# Patient Record
Sex: MALE | Race: WHITE
[De-identification: names, ages, dates, MRNs, and addresses within clinical notes are randomized per-mention and may not be internally consistent; named-entity substitution may affect disease eponyms.]

---

## 2019-03-08 PROBLEM — Z00.00 ENCOUNTER FOR PREVENTIVE HEALTH EXAMINATION: Status: ACTIVE | Noted: 2019-03-08

## 2019-09-22 ENCOUNTER — TRANSCRIPTION ENCOUNTER (OUTPATIENT)
Age: 69
End: 2019-09-22

## 2019-10-01 ENCOUNTER — EMERGENCY (EMERGENCY)
Facility: HOSPITAL | Age: 69
LOS: 0 days | Discharge: LEFT BEFORE DISCHARGE | End: 2019-10-01
Admitting: EMERGENCY MEDICINE

## 2019-10-01 VITALS
OXYGEN SATURATION: 85 % | TEMPERATURE: 101 F | HEART RATE: 120 BPM | RESPIRATION RATE: 18 BRPM | SYSTOLIC BLOOD PRESSURE: 125 MMHG | DIASTOLIC BLOOD PRESSURE: 70 MMHG

## 2019-10-01 DIAGNOSIS — R50.9 FEVER, UNSPECIFIED: ICD-10-CM

## 2019-10-01 DIAGNOSIS — R53.1 WEAKNESS: ICD-10-CM

## 2019-10-01 NOTE — ED ADULT NURSE NOTE - EXPLANATION OF PATIENT'S REASON FOR LEAVING
Patient left without being seen by a doctor. Family stated that they were taking him to another hospital and did not want to wait. Patient informed on risks of leaving the hospital and the severity of the patient's illness. Patient and family both continually wanted to leave. Informed both family and patient that the patient needs medical assistance and to return to the ER.

## 2022-01-01 ENCOUNTER — INPATIENT (INPATIENT)
Facility: HOSPITAL | Age: 72
LOS: 2 days | End: 2022-04-14
Attending: INTERNAL MEDICINE | Admitting: INTERNAL MEDICINE
Payer: MEDICARE

## 2022-01-01 VITALS
DIASTOLIC BLOOD PRESSURE: 64 MMHG | RESPIRATION RATE: 22 BRPM | TEMPERATURE: 100 F | HEART RATE: 103 BPM | SYSTOLIC BLOOD PRESSURE: 97 MMHG | OXYGEN SATURATION: 94 %

## 2022-01-01 DIAGNOSIS — Z98.890 OTHER SPECIFIED POSTPROCEDURAL STATES: Chronic | ICD-10-CM

## 2022-01-01 LAB
-  AMPICILLIN: SIGNIFICANT CHANGE UP
-  CEFTRIAXONE: SIGNIFICANT CHANGE UP
-  CIPROFLOXACIN: SIGNIFICANT CHANGE UP
-  ERYTHROMYCIN: SIGNIFICANT CHANGE UP
-  LEVOFLOXACIN: SIGNIFICANT CHANGE UP
-  LEVOFLOXACIN: SIGNIFICANT CHANGE UP
-  NITROFURANTOIN: SIGNIFICANT CHANGE UP
-  PENICILLIN: SIGNIFICANT CHANGE UP
-  TETRACYCLINE: SIGNIFICANT CHANGE UP
-  TRIMETHOPRIM/SULFAMETHOXAZOLE: SIGNIFICANT CHANGE UP
-  VANCOMYCIN: SIGNIFICANT CHANGE UP
-  VANCOMYCIN: SIGNIFICANT CHANGE UP
ALBUMIN SERPL ELPH-MCNC: 2.9 G/DL — LOW (ref 3.5–5.2)
ALBUMIN SERPL ELPH-MCNC: 3.1 G/DL — LOW (ref 3.5–5.2)
ALBUMIN SERPL ELPH-MCNC: 3.2 G/DL — LOW (ref 3.5–5.2)
ALBUMIN SERPL ELPH-MCNC: 3.3 G/DL — LOW (ref 3.5–5.2)
ALBUMIN SERPL ELPH-MCNC: 3.7 G/DL — SIGNIFICANT CHANGE UP (ref 3.5–5.2)
ALP SERPL-CCNC: 128 U/L — HIGH (ref 30–115)
ALP SERPL-CCNC: 137 U/L — HIGH (ref 30–115)
ALP SERPL-CCNC: 150 U/L — HIGH (ref 30–115)
ALP SERPL-CCNC: 89 U/L — SIGNIFICANT CHANGE UP (ref 30–115)
ALP SERPL-CCNC: 99 U/L — SIGNIFICANT CHANGE UP (ref 30–115)
ALT FLD-CCNC: 12 U/L — SIGNIFICANT CHANGE UP (ref 0–41)
ALT FLD-CCNC: 1450 U/L — HIGH (ref 0–41)
ALT FLD-CCNC: 1858 U/L — HIGH (ref 0–41)
ALT FLD-CCNC: 2088 U/L — HIGH (ref 0–41)
ALT FLD-CCNC: 9 U/L — SIGNIFICANT CHANGE UP (ref 0–41)
ANION GAP SERPL CALC-SCNC: 14 MMOL/L — SIGNIFICANT CHANGE UP (ref 7–14)
ANION GAP SERPL CALC-SCNC: 15 MMOL/L — HIGH (ref 7–14)
ANION GAP SERPL CALC-SCNC: 16 MMOL/L — HIGH (ref 7–14)
ANION GAP SERPL CALC-SCNC: 18 MMOL/L — HIGH (ref 7–14)
ANION GAP SERPL CALC-SCNC: 19 MMOL/L — HIGH (ref 7–14)
ANION GAP SERPL CALC-SCNC: 20 MMOL/L — HIGH (ref 7–14)
ANION GAP SERPL CALC-SCNC: 21 MMOL/L — HIGH (ref 7–14)
ANION GAP SERPL CALC-SCNC: 28 MMOL/L — HIGH (ref 7–14)
ANISOCYTOSIS BLD QL: SLIGHT — SIGNIFICANT CHANGE UP
APPEARANCE UR: CLEAR — SIGNIFICANT CHANGE UP
APPEARANCE UR: CLEAR — SIGNIFICANT CHANGE UP
APTT BLD: 23.4 SEC — CRITICAL LOW (ref 27–39.2)
AST SERPL-CCNC: 16 U/L — SIGNIFICANT CHANGE UP (ref 0–41)
AST SERPL-CCNC: 2479 U/L — HIGH (ref 0–41)
AST SERPL-CCNC: 3002 U/L — HIGH (ref 0–41)
AST SERPL-CCNC: 3233 U/L — HIGH (ref 0–41)
AST SERPL-CCNC: 35 U/L — SIGNIFICANT CHANGE UP (ref 0–41)
B PERT IGG+IGM PNL SER: ABNORMAL
BACTERIA # UR AUTO: ABNORMAL
BASE EXCESS BLDV CALC-SCNC: 4.2 MMOL/L — HIGH (ref -2–3)
BASOPHILS # BLD AUTO: 0 K/UL — SIGNIFICANT CHANGE UP (ref 0–0.2)
BASOPHILS # BLD AUTO: 0.05 K/UL — SIGNIFICANT CHANGE UP (ref 0–0.2)
BASOPHILS # BLD AUTO: 0.09 K/UL — SIGNIFICANT CHANGE UP (ref 0–0.2)
BASOPHILS # BLD AUTO: 0.1 K/UL — SIGNIFICANT CHANGE UP (ref 0–0.2)
BASOPHILS NFR BLD AUTO: 0 % — SIGNIFICANT CHANGE UP (ref 0–1)
BASOPHILS NFR BLD AUTO: 0.3 % — SIGNIFICANT CHANGE UP (ref 0–1)
BASOPHILS NFR BLD AUTO: 0.4 % — SIGNIFICANT CHANGE UP (ref 0–1)
BASOPHILS NFR BLD AUTO: 0.5 % — SIGNIFICANT CHANGE UP (ref 0–1)
BILIRUB SERPL-MCNC: 0.7 MG/DL — SIGNIFICANT CHANGE UP (ref 0.2–1.2)
BILIRUB SERPL-MCNC: 0.8 MG/DL — SIGNIFICANT CHANGE UP (ref 0.2–1.2)
BILIRUB SERPL-MCNC: 0.9 MG/DL — SIGNIFICANT CHANGE UP (ref 0.2–1.2)
BILIRUB UR-MCNC: NEGATIVE — SIGNIFICANT CHANGE UP
BILIRUB UR-MCNC: NEGATIVE — SIGNIFICANT CHANGE UP
BUN SERPL-MCNC: 30 MG/DL — HIGH (ref 10–20)
BUN SERPL-MCNC: 32 MG/DL — HIGH (ref 10–20)
BUN SERPL-MCNC: 33 MG/DL — HIGH (ref 10–20)
BUN SERPL-MCNC: 38 MG/DL — HIGH (ref 10–20)
BUN SERPL-MCNC: 38 MG/DL — HIGH (ref 10–20)
BUN SERPL-MCNC: 50 MG/DL — HIGH (ref 10–20)
BUN SERPL-MCNC: 55 MG/DL — HIGH (ref 10–20)
BUN SERPL-MCNC: 56 MG/DL — HIGH (ref 10–20)
BURR CELLS BLD QL SMEAR: SIGNIFICANT CHANGE UP
CA-I SERPL-SCNC: 1.11 MMOL/L — LOW (ref 1.15–1.33)
CALCIUM SERPL-MCNC: 8.2 MG/DL — LOW (ref 8.5–10.1)
CALCIUM SERPL-MCNC: 8.4 MG/DL — LOW (ref 8.5–10.1)
CALCIUM SERPL-MCNC: 8.5 MG/DL — SIGNIFICANT CHANGE UP (ref 8.5–10.1)
CALCIUM SERPL-MCNC: 8.5 MG/DL — SIGNIFICANT CHANGE UP (ref 8.5–10.1)
CALCIUM SERPL-MCNC: 8.6 MG/DL — SIGNIFICANT CHANGE UP (ref 8.5–10.1)
CALCIUM SERPL-MCNC: 8.9 MG/DL — SIGNIFICANT CHANGE UP (ref 8.5–10.1)
CALCIUM SERPL-MCNC: 9.1 MG/DL — SIGNIFICANT CHANGE UP (ref 8.5–10.1)
CALCIUM SERPL-MCNC: 9.5 MG/DL — SIGNIFICANT CHANGE UP (ref 8.5–10.1)
CHLORIDE SERPL-SCNC: 88 MMOL/L — LOW (ref 98–110)
CHLORIDE SERPL-SCNC: 90 MMOL/L — LOW (ref 98–110)
CHLORIDE SERPL-SCNC: 91 MMOL/L — LOW (ref 98–110)
CHLORIDE SERPL-SCNC: 94 MMOL/L — LOW (ref 98–110)
CHLORIDE SERPL-SCNC: 95 MMOL/L — LOW (ref 98–110)
CHLORIDE SERPL-SCNC: 95 MMOL/L — LOW (ref 98–110)
CHLORIDE SERPL-SCNC: 96 MMOL/L — LOW (ref 98–110)
CHLORIDE SERPL-SCNC: 97 MMOL/L — LOW (ref 98–110)
CO2 SERPL-SCNC: 14 MMOL/L — LOW (ref 17–32)
CO2 SERPL-SCNC: 16 MMOL/L — LOW (ref 17–32)
CO2 SERPL-SCNC: 21 MMOL/L — SIGNIFICANT CHANGE UP (ref 17–32)
CO2 SERPL-SCNC: 21 MMOL/L — SIGNIFICANT CHANGE UP (ref 17–32)
CO2 SERPL-SCNC: 23 MMOL/L — SIGNIFICANT CHANGE UP (ref 17–32)
CO2 SERPL-SCNC: 24 MMOL/L — SIGNIFICANT CHANGE UP (ref 17–32)
CO2 SERPL-SCNC: 25 MMOL/L — SIGNIFICANT CHANGE UP (ref 17–32)
CO2 SERPL-SCNC: 9 MMOL/L — CRITICAL LOW (ref 17–32)
COLOR FLD: YELLOW — SIGNIFICANT CHANGE UP
COLOR SPEC: YELLOW — SIGNIFICANT CHANGE UP
COLOR SPEC: YELLOW — SIGNIFICANT CHANGE UP
COMMENT - URINE: SIGNIFICANT CHANGE UP
CREAT SERPL-MCNC: 1.9 MG/DL — HIGH (ref 0.7–1.5)
CREAT SERPL-MCNC: 1.9 MG/DL — HIGH (ref 0.7–1.5)
CREAT SERPL-MCNC: 2 MG/DL — HIGH (ref 0.7–1.5)
CREAT SERPL-MCNC: 2.1 MG/DL — HIGH (ref 0.7–1.5)
CREAT SERPL-MCNC: 2.2 MG/DL — HIGH (ref 0.7–1.5)
CREAT SERPL-MCNC: 2.4 MG/DL — HIGH (ref 0.7–1.5)
CREAT SERPL-MCNC: 2.5 MG/DL — HIGH (ref 0.7–1.5)
CREAT SERPL-MCNC: 2.9 MG/DL — HIGH (ref 0.7–1.5)
CULTURE RESULTS: SIGNIFICANT CHANGE UP
D DIMER BLD IA.RAPID-MCNC: 1374 NG/ML DDU — HIGH (ref 0–230)
DIFF PNL FLD: ABNORMAL
DIFF PNL FLD: NEGATIVE — SIGNIFICANT CHANGE UP
EGFR: 22 ML/MIN/1.73M2 — LOW
EGFR: 27 ML/MIN/1.73M2 — LOW
EGFR: 28 ML/MIN/1.73M2 — LOW
EGFR: 31 ML/MIN/1.73M2 — LOW
EGFR: 33 ML/MIN/1.73M2 — LOW
EGFR: 35 ML/MIN/1.73M2 — LOW
EGFR: 37 ML/MIN/1.73M2 — LOW
EGFR: 37 ML/MIN/1.73M2 — LOW
EOSINOPHIL # BLD AUTO: 0.02 K/UL — SIGNIFICANT CHANGE UP (ref 0–0.7)
EOSINOPHIL # BLD AUTO: 0.03 K/UL — SIGNIFICANT CHANGE UP (ref 0–0.7)
EOSINOPHIL # BLD AUTO: 0.12 K/UL — SIGNIFICANT CHANGE UP (ref 0–0.7)
EOSINOPHIL # BLD AUTO: 0.25 K/UL — SIGNIFICANT CHANGE UP (ref 0–0.7)
EOSINOPHIL NFR BLD AUTO: 0.1 % — SIGNIFICANT CHANGE UP (ref 0–8)
EOSINOPHIL NFR BLD AUTO: 0.2 % — SIGNIFICANT CHANGE UP (ref 0–8)
EOSINOPHIL NFR BLD AUTO: 0.7 % — SIGNIFICANT CHANGE UP (ref 0–8)
EOSINOPHIL NFR BLD AUTO: 0.9 % — SIGNIFICANT CHANGE UP (ref 0–8)
EPI CELLS # UR: 3 /HPF — SIGNIFICANT CHANGE UP (ref 0–5)
FLUID INTAKE SUBSTANCE CLASS: SIGNIFICANT CHANGE UP
GAS PNL BLDV: 131 MMOL/L — LOW (ref 136–145)
GAS PNL BLDV: SIGNIFICANT CHANGE UP
GAS PNL BLDV: SIGNIFICANT CHANGE UP
GIANT PLATELETS BLD QL SMEAR: PRESENT — SIGNIFICANT CHANGE UP
GLUCOSE BLDC GLUCOMTR-MCNC: 128 MG/DL — HIGH (ref 70–99)
GLUCOSE BLDC GLUCOMTR-MCNC: 130 MG/DL — HIGH (ref 70–99)
GLUCOSE BLDC GLUCOMTR-MCNC: 182 MG/DL — HIGH (ref 70–99)
GLUCOSE BLDC GLUCOMTR-MCNC: 182 MG/DL — HIGH (ref 70–99)
GLUCOSE BLDC GLUCOMTR-MCNC: 199 MG/DL — HIGH (ref 70–99)
GLUCOSE FLD-MCNC: 102 MG/DL — SIGNIFICANT CHANGE UP
GLUCOSE SERPL-MCNC: 118 MG/DL — HIGH (ref 70–99)
GLUCOSE SERPL-MCNC: 122 MG/DL — HIGH (ref 70–99)
GLUCOSE SERPL-MCNC: 128 MG/DL — HIGH (ref 70–99)
GLUCOSE SERPL-MCNC: 130 MG/DL — HIGH (ref 70–99)
GLUCOSE SERPL-MCNC: 152 MG/DL — HIGH (ref 70–99)
GLUCOSE SERPL-MCNC: 154 MG/DL — HIGH (ref 70–99)
GLUCOSE SERPL-MCNC: 158 MG/DL — HIGH (ref 70–99)
GLUCOSE SERPL-MCNC: 167 MG/DL — HIGH (ref 70–99)
GLUCOSE UR QL: NEGATIVE — SIGNIFICANT CHANGE UP
GLUCOSE UR QL: NEGATIVE — SIGNIFICANT CHANGE UP
GRAM STN FLD: SIGNIFICANT CHANGE UP
HCO3 BLDV-SCNC: 29 MMOL/L — SIGNIFICANT CHANGE UP (ref 22–29)
HCT VFR BLD CALC: 32.9 % — LOW (ref 42–52)
HCT VFR BLD CALC: 33.1 % — LOW (ref 42–52)
HCT VFR BLD CALC: 35.2 % — LOW (ref 42–52)
HCT VFR BLD CALC: 35.5 % — LOW (ref 42–52)
HCT VFR BLD CALC: 44 % — SIGNIFICANT CHANGE UP (ref 42–52)
HCT VFR BLDA CALC: 39 % — SIGNIFICANT CHANGE UP (ref 39–51)
HCV AB S/CO SERPL IA: 0.03 COI — SIGNIFICANT CHANGE UP
HCV AB SERPL-IMP: SIGNIFICANT CHANGE UP
HGB BLD CALC-MCNC: 12.9 G/DL — SIGNIFICANT CHANGE UP (ref 12.6–17.4)
HGB BLD-MCNC: 11.1 G/DL — LOW (ref 14–18)
HGB BLD-MCNC: 11.3 G/DL — LOW (ref 14–18)
HGB BLD-MCNC: 11.5 G/DL — LOW (ref 14–18)
HGB BLD-MCNC: 11.5 G/DL — LOW (ref 14–18)
HGB BLD-MCNC: 14.3 G/DL — SIGNIFICANT CHANGE UP (ref 14–18)
HIV 1+2 AB+HIV1 P24 AG SERPL QL IA: SIGNIFICANT CHANGE UP
HYALINE CASTS # UR AUTO: 4 /LPF — SIGNIFICANT CHANGE UP (ref 0–7)
IMM GRANULOCYTES NFR BLD AUTO: 0.7 % — HIGH (ref 0.1–0.3)
IMM GRANULOCYTES NFR BLD AUTO: 1 % — HIGH (ref 0.1–0.3)
IMM GRANULOCYTES NFR BLD AUTO: 1.3 % — HIGH (ref 0.1–0.3)
INR BLD: 1.18 RATIO — SIGNIFICANT CHANGE UP (ref 0.65–1.3)
KETONES UR-MCNC: NEGATIVE — SIGNIFICANT CHANGE UP
KETONES UR-MCNC: NEGATIVE — SIGNIFICANT CHANGE UP
LACTATE BLDV-MCNC: 2.8 MMOL/L — HIGH (ref 0.5–2)
LACTATE SERPL-SCNC: 2.4 MMOL/L — HIGH (ref 0.7–2)
LACTATE SERPL-SCNC: 7.1 MMOL/L — CRITICAL HIGH (ref 0.7–2)
LDH SERPL L TO P-CCNC: 300 U/L — SIGNIFICANT CHANGE UP
LEUKOCYTE ESTERASE UR-ACNC: NEGATIVE — SIGNIFICANT CHANGE UP
LEUKOCYTE ESTERASE UR-ACNC: NEGATIVE — SIGNIFICANT CHANGE UP
LYMPHOCYTES # BLD AUTO: 10.8 % — LOW (ref 20.5–51.1)
LYMPHOCYTES # BLD AUTO: 11.5 % — LOW (ref 20.5–51.1)
LYMPHOCYTES # BLD AUTO: 15.9 % — LOW (ref 20.5–51.1)
LYMPHOCYTES # BLD AUTO: 2.2 K/UL — SIGNIFICANT CHANGE UP (ref 1.2–3.4)
LYMPHOCYTES # BLD AUTO: 2.66 K/UL — SIGNIFICANT CHANGE UP (ref 1.2–3.4)
LYMPHOCYTES # BLD AUTO: 2.88 K/UL — SIGNIFICANT CHANGE UP (ref 1.2–3.4)
LYMPHOCYTES # BLD AUTO: 35.6 % — SIGNIFICANT CHANGE UP (ref 20.5–51.1)
LYMPHOCYTES # BLD AUTO: 9.9 K/UL — HIGH (ref 1.2–3.4)
LYMPHOCYTES # FLD: 19 — SIGNIFICANT CHANGE UP
MACROCYTES BLD QL: SLIGHT — SIGNIFICANT CHANGE UP
MAGNESIUM SERPL-MCNC: 1.6 MG/DL — LOW (ref 1.8–2.4)
MAGNESIUM SERPL-MCNC: 1.9 MG/DL — SIGNIFICANT CHANGE UP (ref 1.8–2.4)
MAGNESIUM SERPL-MCNC: 1.9 MG/DL — SIGNIFICANT CHANGE UP (ref 1.8–2.4)
MAGNESIUM SERPL-MCNC: 2.4 MG/DL — SIGNIFICANT CHANGE UP (ref 1.8–2.4)
MAGNESIUM SERPL-MCNC: 2.9 MG/DL — HIGH (ref 1.8–2.4)
MANUAL SMEAR VERIFICATION: SIGNIFICANT CHANGE UP
MCHC RBC-ENTMCNC: 29 PG — SIGNIFICANT CHANGE UP (ref 27–31)
MCHC RBC-ENTMCNC: 29.2 PG — SIGNIFICANT CHANGE UP (ref 27–31)
MCHC RBC-ENTMCNC: 29.2 PG — SIGNIFICANT CHANGE UP (ref 27–31)
MCHC RBC-ENTMCNC: 29.5 PG — SIGNIFICANT CHANGE UP (ref 27–31)
MCHC RBC-ENTMCNC: 29.7 PG — SIGNIFICANT CHANGE UP (ref 27–31)
MCHC RBC-ENTMCNC: 32.4 G/DL — SIGNIFICANT CHANGE UP (ref 32–37)
MCHC RBC-ENTMCNC: 32.5 G/DL — SIGNIFICANT CHANGE UP (ref 32–37)
MCHC RBC-ENTMCNC: 32.7 G/DL — SIGNIFICANT CHANGE UP (ref 32–37)
MCHC RBC-ENTMCNC: 33.7 G/DL — SIGNIFICANT CHANGE UP (ref 32–37)
MCHC RBC-ENTMCNC: 34.1 G/DL — SIGNIFICANT CHANGE UP (ref 32–37)
MCV RBC AUTO: 86.9 FL — SIGNIFICANT CHANGE UP (ref 80–94)
MCV RBC AUTO: 87.5 FL — SIGNIFICANT CHANGE UP (ref 80–94)
MCV RBC AUTO: 89.3 FL — SIGNIFICANT CHANGE UP (ref 80–94)
MCV RBC AUTO: 89.6 FL — SIGNIFICANT CHANGE UP (ref 80–94)
MCV RBC AUTO: 89.8 FL — SIGNIFICANT CHANGE UP (ref 80–94)
METHOD TYPE: SIGNIFICANT CHANGE UP
MONOCYTES # BLD AUTO: 1.7 K/UL — HIGH (ref 0.1–0.6)
MONOCYTES # BLD AUTO: 1.8 K/UL — HIGH (ref 0.1–0.6)
MONOCYTES # BLD AUTO: 1.92 K/UL — HIGH (ref 0.1–0.6)
MONOCYTES # BLD AUTO: 2.17 K/UL — HIGH (ref 0.1–0.6)
MONOCYTES NFR BLD AUTO: 10.6 % — HIGH (ref 1.7–9.3)
MONOCYTES NFR BLD AUTO: 6.9 % — SIGNIFICANT CHANGE UP (ref 1.7–9.3)
MONOCYTES NFR BLD AUTO: 7.8 % — SIGNIFICANT CHANGE UP (ref 1.7–9.3)
MONOCYTES NFR BLD AUTO: 9.4 % — HIGH (ref 1.7–9.3)
MONOS+MACROS # FLD: 12 % — SIGNIFICANT CHANGE UP
MRSA PCR RESULT.: NEGATIVE — SIGNIFICANT CHANGE UP
NEUTROPHILS # BLD AUTO: 12.9 K/UL — HIGH (ref 1.4–6.5)
NEUTROPHILS # BLD AUTO: 14.85 K/UL — HIGH (ref 1.4–6.5)
NEUTROPHILS # BLD AUTO: 15.49 K/UL — HIGH (ref 1.4–6.5)
NEUTROPHILS # BLD AUTO: 19.96 K/UL — HIGH (ref 1.4–6.5)
NEUTROPHILS NFR BLD AUTO: 54.8 % — SIGNIFICANT CHANGE UP (ref 42.2–75.2)
NEUTROPHILS NFR BLD AUTO: 71 % — SIGNIFICANT CHANGE UP (ref 42.2–75.2)
NEUTROPHILS NFR BLD AUTO: 77.9 % — HIGH (ref 42.2–75.2)
NEUTROPHILS NFR BLD AUTO: 80.8 % — HIGH (ref 42.2–75.2)
NEUTROPHILS-BODY FLUID: 69 % — SIGNIFICANT CHANGE UP
NEUTS BAND # BLD: 0.9 % — SIGNIFICANT CHANGE UP (ref 0–6)
NITRITE UR-MCNC: NEGATIVE — SIGNIFICANT CHANGE UP
NITRITE UR-MCNC: NEGATIVE — SIGNIFICANT CHANGE UP
NRBC # BLD: 0 /100 WBCS — SIGNIFICANT CHANGE UP (ref 0–0)
NRBC # BLD: 1 /100 — HIGH (ref 0–0)
NRBC # BLD: SIGNIFICANT CHANGE UP /100 WBCS (ref 0–0)
NT-PROBNP SERPL-SCNC: HIGH PG/ML (ref 0–300)
ORGANISM # SPEC MICROSCOPIC CNT: SIGNIFICANT CHANGE UP
OVALOCYTES BLD QL SMEAR: SLIGHT — SIGNIFICANT CHANGE UP
PCO2 BLDV: 44 MMHG — SIGNIFICANT CHANGE UP (ref 42–55)
PH BLDV: 7.43 — SIGNIFICANT CHANGE UP (ref 7.32–7.43)
PH UR: 6 — SIGNIFICANT CHANGE UP (ref 5–8)
PH UR: 6.5 — SIGNIFICANT CHANGE UP (ref 5–8)
PHOSPHATE SERPL-MCNC: 4 MG/DL — SIGNIFICANT CHANGE UP (ref 2.1–4.9)
PHOSPHATE SERPL-MCNC: 8.7 MG/DL — HIGH (ref 2.1–4.9)
PLAT MORPH BLD: NORMAL — SIGNIFICANT CHANGE UP
PLATELET # BLD AUTO: 143 K/UL — SIGNIFICANT CHANGE UP (ref 130–400)
PLATELET # BLD AUTO: 184 K/UL — SIGNIFICANT CHANGE UP (ref 130–400)
PLATELET # BLD AUTO: 198 K/UL — SIGNIFICANT CHANGE UP (ref 130–400)
PLATELET # BLD AUTO: 204 K/UL — SIGNIFICANT CHANGE UP (ref 130–400)
PLATELET # BLD AUTO: 209 K/UL — SIGNIFICANT CHANGE UP (ref 130–400)
PO2 BLDV: 30 MMHG — SIGNIFICANT CHANGE UP
POIKILOCYTOSIS BLD QL AUTO: SIGNIFICANT CHANGE UP
POLYCHROMASIA BLD QL SMEAR: SLIGHT — SIGNIFICANT CHANGE UP
POTASSIUM BLDV-SCNC: 4 MMOL/L — SIGNIFICANT CHANGE UP (ref 3.5–5.1)
POTASSIUM SERPL-MCNC: 3.8 MMOL/L — SIGNIFICANT CHANGE UP (ref 3.5–5)
POTASSIUM SERPL-MCNC: 4.2 MMOL/L — SIGNIFICANT CHANGE UP (ref 3.5–5)
POTASSIUM SERPL-MCNC: 4.2 MMOL/L — SIGNIFICANT CHANGE UP (ref 3.5–5)
POTASSIUM SERPL-MCNC: 4.5 MMOL/L — SIGNIFICANT CHANGE UP (ref 3.5–5)
POTASSIUM SERPL-MCNC: 4.6 MMOL/L — SIGNIFICANT CHANGE UP (ref 3.5–5)
POTASSIUM SERPL-MCNC: 4.7 MMOL/L — SIGNIFICANT CHANGE UP (ref 3.5–5)
POTASSIUM SERPL-MCNC: 5.1 MMOL/L — HIGH (ref 3.5–5)
POTASSIUM SERPL-MCNC: 5.6 MMOL/L — HIGH (ref 3.5–5)
POTASSIUM SERPL-SCNC: 3.8 MMOL/L — SIGNIFICANT CHANGE UP (ref 3.5–5)
POTASSIUM SERPL-SCNC: 4.2 MMOL/L — SIGNIFICANT CHANGE UP (ref 3.5–5)
POTASSIUM SERPL-SCNC: 4.2 MMOL/L — SIGNIFICANT CHANGE UP (ref 3.5–5)
POTASSIUM SERPL-SCNC: 4.5 MMOL/L — SIGNIFICANT CHANGE UP (ref 3.5–5)
POTASSIUM SERPL-SCNC: 4.6 MMOL/L — SIGNIFICANT CHANGE UP (ref 3.5–5)
POTASSIUM SERPL-SCNC: 4.7 MMOL/L — SIGNIFICANT CHANGE UP (ref 3.5–5)
POTASSIUM SERPL-SCNC: 5.1 MMOL/L — HIGH (ref 3.5–5)
POTASSIUM SERPL-SCNC: 5.6 MMOL/L — HIGH (ref 3.5–5)
PROCALCITONIN SERPL-MCNC: 8.22 NG/ML — HIGH (ref 0.02–0.1)
PROCALCITONIN SERPL-MCNC: 8.94 NG/ML — HIGH (ref 0.02–0.1)
PROT FLD-MCNC: 3.6 G/DL — SIGNIFICANT CHANGE UP
PROT SERPL-MCNC: 5 G/DL — LOW (ref 6–8)
PROT SERPL-MCNC: 5.1 G/DL — LOW (ref 6–8)
PROT SERPL-MCNC: 6 G/DL — SIGNIFICANT CHANGE UP (ref 6–8)
PROT UR-MCNC: NEGATIVE — SIGNIFICANT CHANGE UP
PROT UR-MCNC: NEGATIVE — SIGNIFICANT CHANGE UP
PROTHROM AB SERPL-ACNC: 13.5 SEC — HIGH (ref 9.95–12.87)
RBC # BLD: 3.76 M/UL — LOW (ref 4.7–6.1)
RBC # BLD: 3.81 M/UL — LOW (ref 4.7–6.1)
RBC # BLD: 3.94 M/UL — LOW (ref 4.7–6.1)
RBC # BLD: 3.96 M/UL — LOW (ref 4.7–6.1)
RBC # BLD: 4.9 M/UL — SIGNIFICANT CHANGE UP (ref 4.7–6.1)
RBC # FLD: 15.3 % — HIGH (ref 11.5–14.5)
RBC # FLD: 15.4 % — HIGH (ref 11.5–14.5)
RBC BLD AUTO: ABNORMAL
RBC CASTS # UR COMP ASSIST: 3 /HPF — SIGNIFICANT CHANGE UP (ref 0–4)
RCV VOL RI: 3000 /UL — HIGH (ref 0–0)
S PNEUM DNA BLD POS QL NAA+NON-PROBE: SIGNIFICANT CHANGE UP
SAO2 % BLDV: 39.7 % — SIGNIFICANT CHANGE UP
SARS-COV-2 RNA SPEC QL NAA+PROBE: SIGNIFICANT CHANGE UP
SODIUM SERPL-SCNC: 125 MMOL/L — LOW (ref 135–146)
SODIUM SERPL-SCNC: 125 MMOL/L — LOW (ref 135–146)
SODIUM SERPL-SCNC: 126 MMOL/L — LOW (ref 135–146)
SODIUM SERPL-SCNC: 131 MMOL/L — LOW (ref 135–146)
SODIUM SERPL-SCNC: 131 MMOL/L — LOW (ref 135–146)
SODIUM SERPL-SCNC: 134 MMOL/L — LOW (ref 135–146)
SODIUM SERPL-SCNC: 137 MMOL/L — SIGNIFICANT CHANGE UP (ref 135–146)
SODIUM SERPL-SCNC: 141 MMOL/L — SIGNIFICANT CHANGE UP (ref 135–146)
SP GR SPEC: 1.01 — SIGNIFICANT CHANGE UP (ref 1.01–1.03)
SP GR SPEC: 1.03 — SIGNIFICANT CHANGE UP (ref 1.01–1.03)
SPECIMEN SOURCE: SIGNIFICANT CHANGE UP
TOTAL NUCLEATED CELL COUNT, BODY FLUID: 3690 /UL — SIGNIFICANT CHANGE UP
TROPONIN T SERPL-MCNC: 0.03 NG/ML — CRITICAL HIGH
TROPONIN T SERPL-MCNC: 0.04 NG/ML — CRITICAL HIGH
TROPONIN T SERPL-MCNC: 0.06 NG/ML — CRITICAL HIGH
TROPONIN T SERPL-MCNC: 0.06 NG/ML — CRITICAL HIGH
TUBE TYPE: SIGNIFICANT CHANGE UP
UROBILINOGEN FLD QL: SIGNIFICANT CHANGE UP
UROBILINOGEN FLD QL: SIGNIFICANT CHANGE UP
WBC # BLD: 17.44 K/UL — HIGH (ref 4.8–10.8)
WBC # BLD: 18.14 K/UL — HIGH (ref 4.8–10.8)
WBC # BLD: 19.06 K/UL — HIGH (ref 4.8–10.8)
WBC # BLD: 24.69 K/UL — HIGH (ref 4.8–10.8)
WBC # BLD: 27.81 K/UL — HIGH (ref 4.8–10.8)
WBC # FLD AUTO: 17.44 K/UL — HIGH (ref 4.8–10.8)
WBC # FLD AUTO: 18.14 K/UL — HIGH (ref 4.8–10.8)
WBC # FLD AUTO: 19.06 K/UL — HIGH (ref 4.8–10.8)
WBC # FLD AUTO: 24.69 K/UL — HIGH (ref 4.8–10.8)
WBC # FLD AUTO: 27.81 K/UL — HIGH (ref 4.8–10.8)
WBC UR QL: 1 /HPF — SIGNIFICANT CHANGE UP (ref 0–5)

## 2022-01-01 PROCEDURE — 99291 CRITICAL CARE FIRST HOUR: CPT

## 2022-01-01 PROCEDURE — 71045 X-RAY EXAM CHEST 1 VIEW: CPT | Mod: 26

## 2022-01-01 PROCEDURE — 93308 TTE F-UP OR LMTD: CPT | Mod: 26,GC

## 2022-01-01 PROCEDURE — 71045 X-RAY EXAM CHEST 1 VIEW: CPT | Mod: 26,77

## 2022-01-01 PROCEDURE — 71275 CT ANGIOGRAPHY CHEST: CPT | Mod: 26,ME

## 2022-01-01 PROCEDURE — G1004: CPT

## 2022-01-01 PROCEDURE — 93010 ELECTROCARDIOGRAM REPORT: CPT

## 2022-01-01 PROCEDURE — 93970 EXTREMITY STUDY: CPT | Mod: 26

## 2022-01-01 PROCEDURE — 93306 TTE W/DOPPLER COMPLETE: CPT | Mod: 26

## 2022-01-01 PROCEDURE — 76604 US EXAM CHEST: CPT | Mod: 26,GC

## 2022-01-01 PROCEDURE — 99291 CRITICAL CARE FIRST HOUR: CPT | Mod: FT,CS,25,GC

## 2022-01-01 PROCEDURE — 32554 ASPIRATE PLEURA W/O IMAGING: CPT

## 2022-01-01 RX ORDER — GABAPENTIN 400 MG/1
600 CAPSULE ORAL DAILY
Refills: 0 | Status: DISCONTINUED | OUTPATIENT
Start: 2022-01-01 | End: 2022-01-01

## 2022-01-01 RX ORDER — TREPROSTINIL 48 UG/1
9 INHALANT ORAL
Qty: 0 | Refills: 0 | DISCHARGE

## 2022-01-01 RX ORDER — GABAPENTIN 400 MG/1
1 CAPSULE ORAL
Qty: 0 | Refills: 0 | DISCHARGE

## 2022-01-01 RX ORDER — CEFEPIME 1 G/1
INJECTION, POWDER, FOR SOLUTION INTRAMUSCULAR; INTRAVENOUS
Refills: 0 | Status: DISCONTINUED | OUTPATIENT
Start: 2022-01-01 | End: 2022-01-01

## 2022-01-01 RX ORDER — CEFEPIME 1 G/1
2000 INJECTION, POWDER, FOR SOLUTION INTRAMUSCULAR; INTRAVENOUS ONCE
Refills: 0 | Status: COMPLETED | OUTPATIENT
Start: 2022-01-01 | End: 2022-01-01

## 2022-01-01 RX ORDER — METHADONE HYDROCHLORIDE 40 MG/1
0 TABLET ORAL
Qty: 0 | Refills: 0 | DISCHARGE

## 2022-01-01 RX ORDER — MEROPENEM 1 G/30ML
1000 INJECTION INTRAVENOUS ONCE
Refills: 0 | Status: COMPLETED | OUTPATIENT
Start: 2022-01-01 | End: 2022-01-01

## 2022-01-01 RX ORDER — MIDAZOLAM HYDROCHLORIDE 1 MG/ML
0.02 INJECTION, SOLUTION INTRAMUSCULAR; INTRAVENOUS
Qty: 100 | Refills: 0 | Status: DISCONTINUED | OUTPATIENT
Start: 2022-01-01 | End: 2022-01-01

## 2022-01-01 RX ORDER — MIDAZOLAM HYDROCHLORIDE 1 MG/ML
4 INJECTION, SOLUTION INTRAMUSCULAR; INTRAVENOUS ONCE
Refills: 0 | Status: DISCONTINUED | OUTPATIENT
Start: 2022-01-01 | End: 2022-01-01

## 2022-01-01 RX ORDER — ONDANSETRON 8 MG/1
4 TABLET, FILM COATED ORAL ONCE
Refills: 0 | Status: COMPLETED | OUTPATIENT
Start: 2022-01-01 | End: 2022-01-01

## 2022-01-01 RX ORDER — ACETAMINOPHEN 500 MG
650 TABLET ORAL ONCE
Refills: 0 | Status: COMPLETED | OUTPATIENT
Start: 2022-01-01 | End: 2022-01-01

## 2022-01-01 RX ORDER — SODIUM CHLORIDE 9 MG/ML
1000 INJECTION INTRAMUSCULAR; INTRAVENOUS; SUBCUTANEOUS
Refills: 0 | Status: DISCONTINUED | OUTPATIENT
Start: 2022-01-01 | End: 2022-01-01

## 2022-01-01 RX ORDER — EPLERENONE 50 MG/1
1 TABLET, FILM COATED ORAL
Qty: 0 | Refills: 0 | DISCHARGE

## 2022-01-01 RX ORDER — PANTOPRAZOLE SODIUM 20 MG/1
40 TABLET, DELAYED RELEASE ORAL DAILY
Refills: 0 | Status: DISCONTINUED | OUTPATIENT
Start: 2022-01-01 | End: 2022-01-01

## 2022-01-01 RX ORDER — ACYCLOVIR SODIUM 500 MG
1 VIAL (EA) INTRAVENOUS
Qty: 0 | Refills: 0 | DISCHARGE

## 2022-01-01 RX ORDER — SODIUM CHLORIDE 9 MG/ML
500 INJECTION, SOLUTION INTRAVENOUS
Refills: 0 | Status: DISCONTINUED | OUTPATIENT
Start: 2022-01-01 | End: 2022-01-01

## 2022-01-01 RX ORDER — SODIUM CHLORIDE 9 MG/ML
2800 INJECTION, SOLUTION INTRAVENOUS ONCE
Refills: 0 | Status: COMPLETED | OUTPATIENT
Start: 2022-01-01 | End: 2022-01-01

## 2022-01-01 RX ORDER — CEFTRIAXONE 500 MG/1
2000 INJECTION, POWDER, FOR SOLUTION INTRAMUSCULAR; INTRAVENOUS EVERY 12 HOURS
Refills: 0 | Status: DISCONTINUED | OUTPATIENT
Start: 2022-01-01 | End: 2022-01-01

## 2022-01-01 RX ORDER — PROPOFOL 10 MG/ML
25 INJECTION, EMULSION INTRAVENOUS ONCE
Refills: 0 | Status: DISCONTINUED | OUTPATIENT
Start: 2022-01-01 | End: 2022-01-01

## 2022-01-01 RX ORDER — PANTOPRAZOLE SODIUM 20 MG/1
40 TABLET, DELAYED RELEASE ORAL
Refills: 0 | Status: DISCONTINUED | OUTPATIENT
Start: 2022-01-01 | End: 2022-01-01

## 2022-01-01 RX ORDER — SODIUM BICARBONATE 1 MEQ/ML
50 SYRINGE (ML) INTRAVENOUS ONCE
Refills: 0 | Status: COMPLETED | OUTPATIENT
Start: 2022-01-01 | End: 2022-01-01

## 2022-01-01 RX ORDER — FENTANYL CITRATE 50 UG/ML
0.5 INJECTION INTRAVENOUS
Qty: 2500 | Refills: 0 | Status: DISCONTINUED | OUTPATIENT
Start: 2022-01-01 | End: 2022-01-01

## 2022-01-01 RX ORDER — DULOXETINE HYDROCHLORIDE 30 MG/1
20 CAPSULE, DELAYED RELEASE ORAL DAILY
Refills: 0 | Status: DISCONTINUED | OUTPATIENT
Start: 2022-01-01 | End: 2022-01-01

## 2022-01-01 RX ORDER — NOREPINEPHRINE BITARTRATE/D5W 8 MG/250ML
0.05 PLASTIC BAG, INJECTION (ML) INTRAVENOUS
Qty: 8 | Refills: 0 | Status: DISCONTINUED | OUTPATIENT
Start: 2022-01-01 | End: 2022-01-01

## 2022-01-01 RX ORDER — CHOLECALCIFEROL (VITAMIN D3) 125 MCG
1 CAPSULE ORAL
Qty: 0 | Refills: 0 | DISCHARGE

## 2022-01-01 RX ORDER — METFORMIN HYDROCHLORIDE 850 MG/1
1 TABLET ORAL
Qty: 0 | Refills: 0 | DISCHARGE

## 2022-01-01 RX ORDER — ASPIRIN/CALCIUM CARB/MAGNESIUM 324 MG
81 TABLET ORAL DAILY
Refills: 0 | Status: DISCONTINUED | OUTPATIENT
Start: 2022-01-01 | End: 2022-01-01

## 2022-01-01 RX ORDER — NOREPINEPHRINE BITARTRATE/D5W 8 MG/250ML
0.05 PLASTIC BAG, INJECTION (ML) INTRAVENOUS
Qty: 16 | Refills: 0 | Status: DISCONTINUED | OUTPATIENT
Start: 2022-01-01 | End: 2022-01-01

## 2022-01-01 RX ORDER — POLYETHYLENE GLYCOL 3350 17 G/17G
17 POWDER, FOR SOLUTION ORAL DAILY
Refills: 0 | Status: DISCONTINUED | OUTPATIENT
Start: 2022-01-01 | End: 2022-01-01

## 2022-01-01 RX ORDER — CHLORHEXIDINE GLUCONATE 213 G/1000ML
1 SOLUTION TOPICAL
Refills: 0 | Status: DISCONTINUED | OUTPATIENT
Start: 2022-01-01 | End: 2022-01-01

## 2022-01-01 RX ORDER — CHOLECALCIFEROL (VITAMIN D3) 125 MCG
1000 CAPSULE ORAL DAILY
Refills: 0 | Status: DISCONTINUED | OUTPATIENT
Start: 2022-01-01 | End: 2022-01-01

## 2022-01-01 RX ORDER — HYDROMORPHONE HYDROCHLORIDE 2 MG/ML
1 INJECTION INTRAMUSCULAR; INTRAVENOUS; SUBCUTANEOUS ONCE
Refills: 0 | Status: DISCONTINUED | OUTPATIENT
Start: 2022-01-01 | End: 2022-01-01

## 2022-01-01 RX ORDER — OXYCODONE HYDROCHLORIDE 5 MG/1
1 TABLET ORAL
Qty: 0 | Refills: 0 | DISCHARGE

## 2022-01-01 RX ORDER — ASPIRIN/CALCIUM CARB/MAGNESIUM 324 MG
1 TABLET ORAL
Qty: 0 | Refills: 0 | DISCHARGE

## 2022-01-01 RX ORDER — CHLORHEXIDINE GLUCONATE 213 G/1000ML
15 SOLUTION TOPICAL EVERY 12 HOURS
Refills: 0 | Status: DISCONTINUED | OUTPATIENT
Start: 2022-01-01 | End: 2022-01-01

## 2022-01-01 RX ORDER — ACETAMINOPHEN 500 MG
650 TABLET ORAL EVERY 6 HOURS
Refills: 0 | Status: DISCONTINUED | OUTPATIENT
Start: 2022-01-01 | End: 2022-01-01

## 2022-01-01 RX ORDER — LOTEPREDNOL ETABONATE 2 MG/ML
1 SUSPENSION/ DROPS OPHTHALMIC
Qty: 0 | Refills: 0 | DISCHARGE

## 2022-01-01 RX ORDER — LORATADINE 10 MG/1
10 TABLET ORAL DAILY
Refills: 0 | Status: DISCONTINUED | OUTPATIENT
Start: 2022-01-01 | End: 2022-01-01

## 2022-01-01 RX ORDER — HYDROMORPHONE HYDROCHLORIDE 2 MG/ML
1 INJECTION INTRAMUSCULAR; INTRAVENOUS; SUBCUTANEOUS
Refills: 0 | Status: DISCONTINUED | OUTPATIENT
Start: 2022-01-01 | End: 2022-01-01

## 2022-01-01 RX ORDER — OXYCODONE HYDROCHLORIDE 5 MG/1
0 TABLET ORAL
Qty: 0 | Refills: 0 | DISCHARGE

## 2022-01-01 RX ORDER — OXYCODONE HYDROCHLORIDE 5 MG/1
5 TABLET ORAL EVERY 4 HOURS
Refills: 0 | Status: DISCONTINUED | OUTPATIENT
Start: 2022-01-01 | End: 2022-01-01

## 2022-01-01 RX ORDER — LORATADINE 10 MG/1
1 TABLET ORAL
Qty: 0 | Refills: 0 | DISCHARGE

## 2022-01-01 RX ORDER — MONTELUKAST 4 MG/1
10 TABLET, CHEWABLE ORAL DAILY
Refills: 0 | Status: DISCONTINUED | OUTPATIENT
Start: 2022-01-01 | End: 2022-01-01

## 2022-01-01 RX ORDER — MAGNESIUM SULFATE 500 MG/ML
1 VIAL (ML) INJECTION ONCE
Refills: 0 | Status: COMPLETED | OUTPATIENT
Start: 2022-01-01 | End: 2022-01-01

## 2022-01-01 RX ORDER — DULOXETINE HYDROCHLORIDE 30 MG/1
1 CAPSULE, DELAYED RELEASE ORAL
Qty: 0 | Refills: 0 | DISCHARGE

## 2022-01-01 RX ORDER — CEFEPIME 1 G/1
2000 INJECTION, POWDER, FOR SOLUTION INTRAMUSCULAR; INTRAVENOUS EVERY 12 HOURS
Refills: 0 | Status: DISCONTINUED | OUTPATIENT
Start: 2022-01-01 | End: 2022-01-01

## 2022-01-01 RX ORDER — FUROSEMIDE 40 MG
1 TABLET ORAL
Qty: 0 | Refills: 0 | DISCHARGE

## 2022-01-01 RX ORDER — MEROPENEM 1 G/30ML
INJECTION INTRAVENOUS
Refills: 0 | Status: DISCONTINUED | OUTPATIENT
Start: 2022-01-01 | End: 2022-01-01

## 2022-01-01 RX ORDER — MAGNESIUM SULFATE 500 MG/ML
2 VIAL (ML) INJECTION ONCE
Refills: 0 | Status: COMPLETED | OUTPATIENT
Start: 2022-01-01 | End: 2022-01-01

## 2022-01-01 RX ORDER — MEROPENEM 1 G/30ML
1000 INJECTION INTRAVENOUS EVERY 12 HOURS
Refills: 0 | Status: DISCONTINUED | OUTPATIENT
Start: 2022-01-01 | End: 2022-01-01

## 2022-01-01 RX ORDER — VANCOMYCIN HCL 1 G
1250 VIAL (EA) INTRAVENOUS EVERY 24 HOURS
Refills: 0 | Status: DISCONTINUED | OUTPATIENT
Start: 2022-01-01 | End: 2022-01-01

## 2022-01-01 RX ORDER — DOCUSATE SODIUM 100 MG
1 CAPSULE ORAL
Qty: 0 | Refills: 0 | DISCHARGE

## 2022-01-01 RX ORDER — MONTELUKAST 4 MG/1
1 TABLET, CHEWABLE ORAL
Qty: 0 | Refills: 0 | DISCHARGE

## 2022-01-01 RX ORDER — ACYCLOVIR SODIUM 500 MG
400 VIAL (EA) INTRAVENOUS
Refills: 0 | Status: DISCONTINUED | OUTPATIENT
Start: 2022-01-01 | End: 2022-01-01

## 2022-01-01 RX ORDER — SENNA PLUS 8.6 MG/1
2 TABLET ORAL AT BEDTIME
Refills: 0 | Status: DISCONTINUED | OUTPATIENT
Start: 2022-01-01 | End: 2022-01-01

## 2022-01-01 RX ORDER — METHADONE HYDROCHLORIDE 40 MG/1
5 TABLET ORAL
Refills: 0 | Status: DISCONTINUED | OUTPATIENT
Start: 2022-01-01 | End: 2022-01-01

## 2022-01-01 RX ORDER — ENOXAPARIN SODIUM 100 MG/ML
40 INJECTION SUBCUTANEOUS EVERY 24 HOURS
Refills: 0 | Status: DISCONTINUED | OUTPATIENT
Start: 2022-01-01 | End: 2022-01-01

## 2022-01-01 RX ORDER — FOLIC ACID 0.8 MG
1 TABLET ORAL
Qty: 0 | Refills: 0 | DISCHARGE

## 2022-01-01 RX ORDER — FOLIC ACID 0.8 MG
1 TABLET ORAL DAILY
Refills: 0 | Status: DISCONTINUED | OUTPATIENT
Start: 2022-01-01 | End: 2022-01-01

## 2022-01-01 RX ORDER — MORPHINE SULFATE 50 MG/1
4 CAPSULE, EXTENDED RELEASE ORAL ONCE
Refills: 0 | Status: DISCONTINUED | OUTPATIENT
Start: 2022-01-01 | End: 2022-01-01

## 2022-01-01 RX ADMIN — Medication 1 TABLET(S): at 11:01

## 2022-01-01 RX ADMIN — Medication 166.67 MILLIGRAM(S): at 23:19

## 2022-01-01 RX ADMIN — LORATADINE 10 MILLIGRAM(S): 10 TABLET ORAL at 11:36

## 2022-01-01 RX ADMIN — POLYETHYLENE GLYCOL 3350 17 GRAM(S): 17 POWDER, FOR SOLUTION ORAL at 11:39

## 2022-01-01 RX ADMIN — PANTOPRAZOLE SODIUM 40 MILLIGRAM(S): 20 TABLET, DELAYED RELEASE ORAL at 22:30

## 2022-01-01 RX ADMIN — CHLORHEXIDINE GLUCONATE 1 APPLICATION(S): 213 SOLUTION TOPICAL at 05:39

## 2022-01-01 RX ADMIN — MONTELUKAST 10 MILLIGRAM(S): 4 TABLET, CHEWABLE ORAL at 11:01

## 2022-01-01 RX ADMIN — CEFEPIME 100 MILLIGRAM(S): 1 INJECTION, POWDER, FOR SOLUTION INTRAMUSCULAR; INTRAVENOUS at 11:00

## 2022-01-01 RX ADMIN — Medication 650 MILLIGRAM(S): at 17:23

## 2022-01-01 RX ADMIN — CHLORHEXIDINE GLUCONATE 1 APPLICATION(S): 213 SOLUTION TOPICAL at 06:09

## 2022-01-01 RX ADMIN — METHADONE HYDROCHLORIDE 5 MILLIGRAM(S): 40 TABLET ORAL at 17:22

## 2022-01-01 RX ADMIN — Medication 400 MILLIGRAM(S): at 05:39

## 2022-01-01 RX ADMIN — DULOXETINE HYDROCHLORIDE 20 MILLIGRAM(S): 30 CAPSULE, DELAYED RELEASE ORAL at 22:30

## 2022-01-01 RX ADMIN — Medication 100 GRAM(S): at 10:29

## 2022-01-01 RX ADMIN — ENOXAPARIN SODIUM 40 MILLIGRAM(S): 100 INJECTION SUBCUTANEOUS at 22:30

## 2022-01-01 RX ADMIN — Medication 400 MILLIGRAM(S): at 17:25

## 2022-01-01 RX ADMIN — METHADONE HYDROCHLORIDE 5 MILLIGRAM(S): 40 TABLET ORAL at 05:04

## 2022-01-01 RX ADMIN — SODIUM CHLORIDE 1000 MILLILITER(S): 9 INJECTION INTRAMUSCULAR; INTRAVENOUS; SUBCUTANEOUS at 21:51

## 2022-01-01 RX ADMIN — Medication 81 MILLIGRAM(S): at 12:23

## 2022-01-01 RX ADMIN — Medication 1 TABLET(S): at 12:23

## 2022-01-01 RX ADMIN — ONDANSETRON 4 MILLIGRAM(S): 8 TABLET, FILM COATED ORAL at 14:18

## 2022-01-01 RX ADMIN — LORATADINE 10 MILLIGRAM(S): 10 TABLET ORAL at 12:24

## 2022-01-01 RX ADMIN — LORATADINE 10 MILLIGRAM(S): 10 TABLET ORAL at 11:01

## 2022-01-01 RX ADMIN — GABAPENTIN 600 MILLIGRAM(S): 400 CAPSULE ORAL at 12:23

## 2022-01-01 RX ADMIN — METHADONE HYDROCHLORIDE 5 MILLIGRAM(S): 40 TABLET ORAL at 17:01

## 2022-01-01 RX ADMIN — MONTELUKAST 10 MILLIGRAM(S): 4 TABLET, CHEWABLE ORAL at 12:22

## 2022-01-01 RX ADMIN — Medication 1 MILLIGRAM(S): at 12:22

## 2022-01-01 RX ADMIN — Medication 650 MILLIGRAM(S): at 17:22

## 2022-01-01 RX ADMIN — DULOXETINE HYDROCHLORIDE 20 MILLIGRAM(S): 30 CAPSULE, DELAYED RELEASE ORAL at 11:02

## 2022-01-01 RX ADMIN — MORPHINE SULFATE 4 MILLIGRAM(S): 50 CAPSULE, EXTENDED RELEASE ORAL at 17:54

## 2022-01-01 RX ADMIN — MONTELUKAST 10 MILLIGRAM(S): 4 TABLET, CHEWABLE ORAL at 22:30

## 2022-01-01 RX ADMIN — GABAPENTIN 600 MILLIGRAM(S): 400 CAPSULE ORAL at 22:31

## 2022-01-01 RX ADMIN — MONTELUKAST 10 MILLIGRAM(S): 4 TABLET, CHEWABLE ORAL at 11:36

## 2022-01-01 RX ADMIN — CEFTRIAXONE 100 MILLIGRAM(S): 500 INJECTION, POWDER, FOR SOLUTION INTRAMUSCULAR; INTRAVENOUS at 05:05

## 2022-01-01 RX ADMIN — GABAPENTIN 600 MILLIGRAM(S): 400 CAPSULE ORAL at 11:01

## 2022-01-01 RX ADMIN — Medication 1 MILLIGRAM(S): at 11:01

## 2022-01-01 RX ADMIN — CEFTRIAXONE 100 MILLIGRAM(S): 500 INJECTION, POWDER, FOR SOLUTION INTRAMUSCULAR; INTRAVENOUS at 06:08

## 2022-01-01 RX ADMIN — Medication 81 MILLIGRAM(S): at 11:02

## 2022-01-01 RX ADMIN — Medication 1000 UNIT(S): at 12:23

## 2022-01-01 RX ADMIN — Medication 81 MILLIGRAM(S): at 22:30

## 2022-01-01 RX ADMIN — PANTOPRAZOLE SODIUM 40 MILLIGRAM(S): 20 TABLET, DELAYED RELEASE ORAL at 05:41

## 2022-01-01 RX ADMIN — OXYCODONE HYDROCHLORIDE 5 MILLIGRAM(S): 5 TABLET ORAL at 14:28

## 2022-01-01 RX ADMIN — MEROPENEM 100 MILLIGRAM(S): 1 INJECTION INTRAVENOUS at 22:29

## 2022-01-01 RX ADMIN — Medication 400 MILLIGRAM(S): at 05:04

## 2022-01-01 RX ADMIN — CHLORHEXIDINE GLUCONATE 1 APPLICATION(S): 213 SOLUTION TOPICAL at 05:04

## 2022-01-01 RX ADMIN — SODIUM CHLORIDE 1000 MILLILITER(S): 9 INJECTION, SOLUTION INTRAVENOUS at 03:07

## 2022-01-01 RX ADMIN — Medication 25 GRAM(S): at 14:07

## 2022-01-01 RX ADMIN — Medication 400 MILLIGRAM(S): at 06:07

## 2022-01-01 RX ADMIN — Medication 8.58 MICROGRAM(S)/KG/MIN: at 22:42

## 2022-01-01 RX ADMIN — Medication 400 MILLIGRAM(S): at 18:30

## 2022-01-01 RX ADMIN — CEFTRIAXONE 100 MILLIGRAM(S): 500 INJECTION, POWDER, FOR SOLUTION INTRAMUSCULAR; INTRAVENOUS at 17:25

## 2022-01-01 RX ADMIN — METHADONE HYDROCHLORIDE 5 MILLIGRAM(S): 40 TABLET ORAL at 18:30

## 2022-01-01 RX ADMIN — METHADONE HYDROCHLORIDE 5 MILLIGRAM(S): 40 TABLET ORAL at 05:40

## 2022-01-01 RX ADMIN — Medication 1 MILLIGRAM(S): at 11:37

## 2022-01-01 RX ADMIN — POLYETHYLENE GLYCOL 3350 17 GRAM(S): 17 POWDER, FOR SOLUTION ORAL at 11:01

## 2022-01-01 RX ADMIN — Medication 50 MILLIEQUIVALENT(S): at 16:33

## 2022-01-01 RX ADMIN — Medication 81 MILLIGRAM(S): at 11:36

## 2022-01-01 RX ADMIN — Medication 400 MILLIGRAM(S): at 17:01

## 2022-01-01 RX ADMIN — HYDROMORPHONE HYDROCHLORIDE 1 MILLIGRAM(S): 2 INJECTION INTRAMUSCULAR; INTRAVENOUS; SUBCUTANEOUS at 15:18

## 2022-01-01 RX ADMIN — Medication 166.67 MILLIGRAM(S): at 22:29

## 2022-01-01 RX ADMIN — MEROPENEM 100 MILLIGRAM(S): 1 INJECTION INTRAVENOUS at 11:45

## 2022-01-01 RX ADMIN — PANTOPRAZOLE SODIUM 40 MILLIGRAM(S): 20 TABLET, DELAYED RELEASE ORAL at 06:07

## 2022-01-01 RX ADMIN — PANTOPRAZOLE SODIUM 40 MILLIGRAM(S): 20 TABLET, DELAYED RELEASE ORAL at 05:04

## 2022-01-01 RX ADMIN — Medication 1000 UNIT(S): at 11:01

## 2022-01-01 RX ADMIN — SODIUM CHLORIDE 2800 MILLILITER(S): 9 INJECTION, SOLUTION INTRAVENOUS at 09:22

## 2022-01-01 RX ADMIN — Medication 650 MILLIGRAM(S): at 09:22

## 2022-01-01 RX ADMIN — Medication 1000 UNIT(S): at 11:36

## 2022-01-01 RX ADMIN — Medication 1 MILLIGRAM(S): at 22:31

## 2022-01-01 RX ADMIN — MIDAZOLAM HYDROCHLORIDE 4 MILLIGRAM(S): 1 INJECTION, SOLUTION INTRAMUSCULAR; INTRAVENOUS at 16:52

## 2022-01-01 RX ADMIN — OXYCODONE HYDROCHLORIDE 5 MILLIGRAM(S): 5 TABLET ORAL at 11:48

## 2022-01-01 RX ADMIN — LORATADINE 10 MILLIGRAM(S): 10 TABLET ORAL at 22:30

## 2022-01-01 RX ADMIN — Medication 166.67 MILLIGRAM(S): at 22:07

## 2022-01-01 RX ADMIN — CEFTRIAXONE 100 MILLIGRAM(S): 500 INJECTION, POWDER, FOR SOLUTION INTRAMUSCULAR; INTRAVENOUS at 17:01

## 2022-01-01 RX ADMIN — DULOXETINE HYDROCHLORIDE 20 MILLIGRAM(S): 30 CAPSULE, DELAYED RELEASE ORAL at 11:37

## 2022-01-01 RX ADMIN — Medication 1000 UNIT(S): at 22:30

## 2022-01-01 RX ADMIN — Medication 2 MILLIGRAM(S): at 17:54

## 2022-01-01 RX ADMIN — Medication 650 MILLIGRAM(S): at 21:45

## 2022-01-01 RX ADMIN — Medication 1 TABLET(S): at 11:36

## 2022-01-01 RX ADMIN — DULOXETINE HYDROCHLORIDE 20 MILLIGRAM(S): 30 CAPSULE, DELAYED RELEASE ORAL at 12:22

## 2022-01-01 RX ADMIN — METHADONE HYDROCHLORIDE 5 MILLIGRAM(S): 40 TABLET ORAL at 06:10

## 2022-04-11 NOTE — H&P ADULT - ASSESSMENT
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 Chest US. Chest tube being placed in ED    CARDIOVASCULAR: Keep i=o. avoid volume overload. 2d ECHO.     - Takes lasix 40mg at home and Eplerenone. Holding for now.     GI: GI prophylaxis.  Feeding as tolerated    RENAL: fu lytes. repeat BMP. lactate 2.4. s/p 2.8 L of IVF.     INFECTIOUS DISEASE: c/w franck, vanc and levaquin. Check urine strep/legionella. Sputum and blood cultures. Check procal.     HEMATOLOGICAL:  DVT prophylaxis.     ENDOCRINE:  Follow up FS.  Insulin protocol if needed.    MUSCULOSKELETAL: bedrest    MICU monitoring  Full code IMPRESSION:    Sepsis present on admission  Acute on chronic hypoxemic respiratory failure  Right loculated/complicated pleural effusion ( ? 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 Chest US. Chest tube being placed in ED, fup  CXR, keep SAO2 88 TO 94%    CARDIOVASCULAR:  avoid volume overload. 2d ECHO.     - Takes lasix 40mg at home and Eplerenone. Holding for now.     GI: GI prophylaxis.  Feeding as tolerated    RENAL: fu lytes. repeat BMP. lactate 2.4. s/p 2.8 L of IVF.     INFECTIOUS DISEASE: c/w franck, Vanco  Check urine strep/legionella. Sputum and blood cultures. Check procal.     HEMATOLOGICAL:  DVT prophylaxis. LE doppler    ENDOCRINE:  Follow up FS.  Insulin protocol if needed.    MUSCULOSKELETAL: bedrest    MICU monitoring  Full code

## 2022-04-11 NOTE — CONSULT NOTE ADULT - SUBJECTIVE AND OBJECTIVE BOX
Patient is a 71y old  Male who presents with a chief complaint of     HPI:  71-year-old male past medical history of CHF, COPD on 5 L home nasal cannula, diabetes presents to the emergency department with shortness of breath, cough and fever.  Shortness of breath been going on for the past 2 or 3 days.  Patient was recently hospitalized in [place] and treated for pneumonia 2 weeks ago.    PAST MEDICAL & SURGICAL HISTORY:      SOCIAL HX:   former smoker, quit 20 years ago. no etoh    FAMILY HISTORY:  :  No known cardiovascular family history     ROS:  See HPI     Allergies    contrast media (iodine-based) (Other)  penicillin (Unknown)    Intolerances          PHYSICAL EXAM    ICU Vital Signs Last 24 Hrs  T(C): 36.3 (2022 11:07), Max: 39.6 (2022 08:56)  T(F): 97.4 (2022 11:07), Max: 103.3 (2022 08:56)  HR: 94 (2022 11:07) (94 - 103)  BP: 100/58 (2022 11:07) (97/64 - 100/58)  BP(mean): --  ABP: --  ABP(mean): --  RR: 20 (2022 12:28) (20 - 22)  SpO2: 97% (2022 12:28) (94% - 97%)      General: In NAD   HEENT:  ALDA              Lymphatic system: No cervical LN   Lungs: decreased bibasilar breath sounds  Cardiovascular: Regular  Gastrointestinal: Soft, Positive BS  Musculoskeletal: +2 pitting edema bilateral LE  Skin: Warm.  Intact  Neurological: No motor or sensory deficit. AAOx3             LABS:                          14.3   24.69 )-----------( 143      ( 2022 09:09 )             44.0                                               04-11    141  |  97<L>  |  30<H>  ----------------------------<  167<H>  5.1<H>   |  24  |  2.2<H>    Ca    9.5      2022 09:09    TPro  6.0  /  Alb  3.7  /  TBili  0.9  /  DBili  x   /  AST  35  /  ALT  12  /  AlkPhos  99  04-11      PT/INR - ( 2022 09:09 )   PT: 13.50 sec;   INR: 1.18 ratio         PTT - ( 2022 09:09 )  PTT:23.4 sec                                       Urinalysis Basic - ( 2022 09:59 )    Color: Yellow / Appearance: Clear / S.010 / pH: x  Gluc: x / Ketone: Negative  / Bili: Negative / Urobili: <2 mg/dL   Blood: x / Protein: Negative / Nitrite: Negative   Leuk Esterase: Negative / RBC: x / WBC x   Sq Epi: x / Non Sq Epi: x / Bacteria: x        CARDIAC MARKERS ( 2022 10:15 )  x     / 0.06 ng/mL / x     / x     / x                                                LIVER FUNCTIONS - ( 2022 09:09 )  Alb: 3.7 g/dL / Pro: 6.0 g/dL / ALK PHOS: 99 U/L / ALT: 12 U/L / AST: 35 U/L / GGT: x

## 2022-04-11 NOTE — H&P ADULT - NSHPPHYSICALEXAM_GEN_ALL_CORE
PHYSICAL EXAM:  GENERAL: NAD, speaks in full sentences, no signs of respiratory distress  HEAD: Atraumatic  NECK: Supple  CHEST/LUNG: Clear to auscultation bilaterally; No wheeze or crackles  HEART: S1, S2; RRR; No murmurs, rubs, or gallops  ABDOMEN: BS+; Soft, Non-tender, Non-distended  EXTREMITIES:  2+ Peripheral Pulses, No clubbing, cyanosis, or edema  PSYCH: AAOx3  NEUROLOGY: non-focal  SKIN: No rashes or lesions PHYSICAL EXAM:  GENERAL: NAD, speaks in full sentences, HFNC  HEAD: Atraumatic  NECK: Supple  CHEST/LUNG: Clear to auscultation bilaterally  HEART: S1, S2; RRR; No murmurs, rubs, or gallops  ABDOMEN: BS+; Soft, Non-tender, Non-distended  EXTREMITIES:  2+ Peripheral Pulses  PSYCH: AAOx3  NEUROLOGY: non-focal  SKIN: No rashes or lesions PHYSICAL EXAM:  GENERAL: ill looking, speaks in full sentences, HFNC  HEAD: Atraumatic  NECK: Supple  CHEST/LUNG: Clear to auscultation bilaterally  HEART: S1, S2; RRR; No murmurs, rubs, or gallops  ABDOMEN: BS+; Soft, Non-tender, Non-distended  EXTREMITIES:  2+ Peripheral Pulses  PSYCH: AAOx3  NEUROLOGY: non-focal  SKIN: No rashes or lesions

## 2022-04-11 NOTE — CONSULT NOTE ADULT - ASSESSMENT
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 CHest US. may need Chest tube placement    CARDIOVASCULAR: Keep i=o. avoid volume overloadr. 2d ECHO.     GI: GI prophylaxis.  Feeding as tolerated    RENAL: fu lytes. repeat BMP    INFECTIOUS DISEASE: Start franck, vanc and levaquin. CHeck urine strep/legionella. Sputum and blood cultures. Check procal.     HEMATOLOGICAL:  DVT prophylaxis.     ENDOCRINE:  Follow up FS.  Insulin protocol if needed.    MUSCULOSKELETAL: bedrest    MICU monitoring  Full code

## 2022-04-11 NOTE — H&P ADULT - NSHPLABSRESULTS_GEN_ALL_CORE
VITALS:   T(F): 97.4  HR: 92  BP: 106/66  RR: 20  SpO2: 95%    LABS:                        14.3   24.69 )-----------( 143      ( 2022 09:09 )             44.0         141  |  97<L>  |  30<H>  ----------------------------<  167<H>  5.1<H>   |  24  |  2.2<H>    Ca    9.5      2022 09:09    TPro  6.0  /  Alb  3.7  /  TBili  0.9  /  DBili  x   /  AST  35  /  ALT  12  /  AlkPhos  99      PT/INR - ( 2022 09:09 )   PT: 13.50 sec;   INR: 1.18 ratio         PTT - ( 2022 09:09 )  PTT:23.4 sec  Urinalysis Basic - ( 2022 09:59 )    Color: Yellow / Appearance: Clear / S.010 / pH: x  Gluc: x / Ketone: Negative  / Bili: Negative / Urobili: <2 mg/dL   Blood: x / Protein: Negative / Nitrite: Negative   Leuk Esterase: Negative / RBC: x / WBC x   Sq Epi: x / Non Sq Epi: x / Bacteria: x        Lactate, Blood: 2.4 mmol/L *H* (22 @ 11:20)  Troponin T, Serum: 0.06 ng/mL *HH* (22 @ 10:15)      CARDIAC MARKERS ( 2022 10:15 )  x     / 0.06 ng/mL / x     / x     / x

## 2022-04-11 NOTE — ED PROCEDURE NOTE - PROCEDURE ADDITIONAL DETAILS
Patient has questionable collection in the RLL with likely PNA in that area. Patient has questionable collection in the RLL, that appears to be septated; concern for empyema/repeat PNA given recent hospital admission for PNA.

## 2022-04-11 NOTE — ED PROVIDER NOTE - OBJECTIVE STATEMENT
70 yo m ho CHF, COPD on 5L nc at home, DM pw SOB x 2-3 days. Pt was being treated for pna 2 weeks ago. Admits to cough, fever (tmax 102 yday), and sob x 3 days. BIBEMS and was saturating ~80s on 5L. Placed on 8L w/ improvement.  Denies cp, abd pain, nvd, ha, vision change  Accompanied by wife 70 yo m ho CHF, COPD on 5L nc at home, DM, CAD pw SOB x 2-3 days. Pt was being treated for pna 2 weeks ago. Admits to cough, fever (tmax 102 yday), and sob x 3 days. BIBEMS and was saturating ~80s on 5L. Placed on 8L w/ improvement.  Denies cp, abd pain, nvd, ha, vision change  Accompanied by wife

## 2022-04-11 NOTE — ED PROVIDER NOTE - CLINICAL SUMMARY MEDICAL DECISION MAKING FREE TEXT BOX
Patient treated for sepsis and had a CT scan of chest performed. Patient required oxygenation with a NRB mask and was later switched to high flow nasal cannula. WBC 24.69. Troponin 0.06, BNP 65789. CT chest: 1.  No CT evidence of acute pulmonary embolus.2.  Moderate size multiloculated right pleural effusions.3.  Conglomerate nodes versus a central mass surrounding the proximal right middle lobe bronchi.4.  Numerous other pulmonary nodules measuring up to 9 mm as described above. Mediastinal and bilateral hilar lymphadenopathy. Possible neoplastic process suspected.5.  Ill-defined sclerosis in the left posterior sixth rib. ICU consulted for care. Thoracocentesis performed at bedside and fluid sent for analysis. ICU team to follow.    ED work up reviewed and results and plan of care discussed with patient. Patient requires admission for further work up, monitoring, and management. Need for admission discussed with patient.

## 2022-04-11 NOTE — ED ADULT TRIAGE NOTE - CHIEF COMPLAINT QUOTE
As per family patient was recently dx with PNA, COPD on 5L home O@2 and presents with fevers and worsening SOB. As per EMS SPO2 on home O2 was 87

## 2022-04-11 NOTE — H&P ADULT - HISTORY OF PRESENT ILLNESS
71 year old male with a hx of CHF (unspecified), COPD (5L [place] home oxygen), CAD, DM presenting with dyspnea. Beginning almost one month ago he began to feel chills, fatigue, body aches, associated with a nonproductive cough. He went to [facility] 2 weeks ago due to these symptoms was diagnosed with pneumonia. Was discharged on a course of azithromycin. Symptoms persisted. Then starting 2-3 days ago, started to develop dyspnea at rest. Denies any chest pain, wheezing, abdominal pain, or n/v. Did endorse increased swelling in lower extremities Called EMS, was found to be saturating 80s on 5L. Was placed on 8L and brought to the ED.   In the ED VITALS: Temp 100.1F, , BP 97/64, RR 22, SpO2 94% on 8L. Began to desaturate further and was placed on HFNC. WBC 24.69. Troponin 0.06, BNP 91366. CT chest: 1.  No CT evidence of acute pulmonary embolus.2.  Moderate size multiloculated right pleural effusions.3.  Conglomerate nodes versus a central mass surrounding the proximal right middle lobe bronchi.4.  Numerous other pulmonary nodules measuring up to 9 mm as described above. Mediastinal and bilateral hilar lymphadenopathy. Possible neoplastic process suspected.5.  Ill-defined sclerosis inthe left posterior sixth rib.     71 year old male with a hx of CHF (unspecified), COPD (5L [place] home oxygen), CAD, DM presenting with dyspnea. Beginning almost one month ago he began to feel chills, fatigue, body aches, associated with a nonproductive cough. He went to [facility] 2 weeks ago due to these symptoms was diagnosed with pneumonia. Was discharged on a course of azithromycin. Symptoms persisted. Then starting 2-3 days ago, started to develop dyspnea at rest. Denies any chest pain, wheezing, abdominal pain, or n/v. Did endorse increased swelling in lower extremities Called EMS, was found to be saturating 80s on 5L. Was placed on 8L and brought to the ED.   In the ED VITALS: Temp 100.1F, , BP 97/64, RR 22, SpO2 94% on 8L. Began to desaturate further and was placed on HFNC. WBC 24.69. Troponin 0.06, BNP 42237. CT chest: 1.  No CT evidence of acute pulmonary embolus.2.  Moderate size multiloculated right pleural effusions.3.  Conglomerate nodes versus a central mass surrounding the proximal right middle lobe bronchi.4.  Numerous other pulmonary nodules measuring up to 9 mm as described above. Mediastinal and bilateral hilar lymphadenopathy. Possible neoplastic process suspected.5.  Ill-defined sclerosis in the left posterior sixth rib. Effusion to be drained in ED. Will be admitted to ICU.

## 2022-04-11 NOTE — ED PROVIDER NOTE - CARE PLAN
Principal Discharge DX:	Severe sepsis   1 Principal Discharge DX:	Severe sepsis  Secondary Diagnosis:	Pneumonia

## 2022-04-11 NOTE — H&P ADULT - NSICDXPASTMEDICALHX_GEN_ALL_CORE_FT
PAST MEDICAL HISTORY:  CAD (coronary atherosclerotic disease)     DM (diabetes mellitus)     Heart failure     History of COPD

## 2022-04-11 NOTE — ED PROVIDER NOTE - PHYSICAL EXAMINATION
CONSTITUTIONAL: NAD  SKIN: Warm dry  HEAD: NCAT  EYES: NL inspection  ENT: MMM  NECK: Supple; non tender.  CARD: RRR  RESP: bl mild rales   ABD: S/NT no R/G  EXT: +1 bl pitting edema   NEURO: Grossly unremarkable  PSYCH: Cooperative, appropriate.

## 2022-04-11 NOTE — ED ADULT NURSE NOTE - CHIEF COMPLAINT QUOTE
Updated patient on MRI results. Imaging showed that treated lesion is slightly larger with surrounding edema, fairly convincing for post-RT necrosis. He is entirely asymptomatic.     Will discuss at CNS tumor board next week, likely plan for 8 week MRI wi As per family patient was recently dx with PNA, COPD on 5L home O@2 and presents with fevers and worsening SOB. As per EMS SPO2 on home O2 was 87

## 2022-04-11 NOTE — ED PROVIDER NOTE - PROGRESS NOTE DETAILS
Initial lacate > 2. Repeat sepsis perfusion exam performed. Patient has warm skin and strong pulses with normal cap refill. Lung exam unchanged form prior. Sepsis suspected at this time. Will send appropriate labs and cultures. 30 cc/kg fluid bolus given based on ideal body weight. Will give broad spectrum antibiotics after blood cultures are drawn. Will follow up initial lactate and repeat after fluids if lactate > 2. SS Repeat lactate improving, 2.4. Stable on HFNC. Patient to be admitted to ICU as per ICU fellow approval. Pt notified and agreeable to plan. Case discussed with and care endorsed to medical admitting resident.

## 2022-04-11 NOTE — ED PROVIDER NOTE - ATTENDING CONTRIBUTION TO CARE
I personally evaluated patient. I agree with the findings and plan with all documentation on chart except as documented  in my note.    71-year-old male past medical history of CHF, COPD on 5 L home nasal cannula, diabetes presents to the emergency department with shortness of breath, cough and fever.  Shortness of breath been going on for the past 2 or 3 days.  Patient was recently hospitalized in [place] and treated for pneumonia 2 weeks ago.    Patient brought to the critical care emergency department in active respiratory distress, hypoxic to the 80s on 5 L nasal cannula.  Patient febrile to 103.  Patient tachypneic and using accessory muscles.  Patient has bibasilar rhonchi worse on the right side.  Patient is tachycardic and irregular rhythm.  No signs of peripheral overload or DVT on exam.  Patient immediately started on increased supplemental oxygen and O2 sat improved.  Large-bore IV placed and sepsis labs sent.  Initial concern was for pulmonary infection and sepsis.  Given recent pneumonia and antibiotics with current fever, will give broad-spectrum antibiotics and get CT scan of chest.  We will send viral panel and other sources of sepsis will be investigated.  At this time patient does not require endotracheal intubation, will have a low threshold for starting BiPAP.  We will follow-up work-up and reassess.      ED work up reviewed and results and plan of care discussed with patient. Patient requires admission for further work up, monitoring, and management. Need for admission discussed with patient.

## 2022-04-11 NOTE — H&P ADULT - ATTENDING COMMENTS
events noted, sepsis present on admission, r loculated r effusion/ mulitple septation on us/ severe COPD/ ? underlying malignancy/ IV ABX, CT, CT sx, fup cx, CE, hold diuretics/ poor prognosis/ GOC/ MICU

## 2022-04-11 NOTE — ED PROCEDURE NOTE - ATTENDING CONTRIBUTION TO CARE
I was physically present and helped performed this Ultrasound.  I supervised all views obtained and reviewed images in real time. I agree with findings documented. Results of Ultrasound discussed with patient.  I personally supervised the study.  I reviewed the images and interpretation by the ACP/Resident and have edited where appropriate.
I was physically present and helped performed this Ultrasound.  I supervised all views obtained and reviewed images in real time. I agree with findings documented. Results of Ultrasound discussed with patient.  I personally supervised the study.  I reviewed the images and interpretation by the ACP/Resident and have edited where appropriate.
I was physically present for and directly supervised this procedure.  Procedure was done as documented without any complications. Ultrasound guidance utilized. Patient has normal lung sliding on Ultrasound and almost completely resolved effusion post procedure. Images saved.

## 2022-04-11 NOTE — PROCEDURAL SAFETY CHECKLIST WITH OR WITHOUT SEDATION - NSPOSTCOMMENTFT_GEN_ALL_CORE
Patient tolerated procedure well. Chest tube not inserted. 500cc of fluid drained. Patient VSS. No s/s distress noted. Will continue to monitor.

## 2022-04-12 NOTE — CONSULT NOTE ADULT - ASSESSMENT
ASSESSMENT  71 year old male with a hx of CHF (unspecified), COPD (5L [place] home oxygen), CAD, DM presenting with dyspnea. Beginning almost one month ago he began to feel chills, fatigue, body aches, associated with a nonproductive cough. He went to [facility] 2 weeks ago due to these symptoms was diagnosed with pneumonia. Was discharged on a course of azithromycin. Symptoms persisted. Then starting 2-3 days ago, started to develop dyspnea at rest    IMPRESSION  #Streptococcus pneumoniae bacteremia with PNA and Severe Sepsis on admission T>101 P>90 WBC 24 ADITI lactic acidosis     4/11 BCX strep pneumo    Procalcitonin, Serum: 8.22 ng/mL (04-11-22 @ 16:00)    4/11 pleural   No polymorphonuclear leukocytes seen    No organisms seen    CT CHest 1.  No CT evidence of acute pulmonary embolus.  2.  Moderate size multiloculated right pleural effusions.  3.  Conglomerate nodes versus a central mass surrounding the proximal right middle lobe bronchi.  4.  Numerous other pulmonary nodules measuring up to 9 mm as described above. Mediastinal and bilateral hilar lymphadenopathy. Possible   neoplastic process suspected.  5.  Ill-defined sclerosis in the left posterior sixth rib.  #Severe Sepsis on admission T<96.8F, T>101F, Pulse>90, Resp Rate>20, WBC>12, wbc<4, Bands>10%, lactic acidosis, metabolic encephalopathy, metabolic acidosis, metabolic alkalosis, ADITI due to suspected Gram negative pneumonia, aspiration pneumonia, pyelonephritis, cystitis, cellulitis, bacteremia  Pt has an acute illness which poses threat to bodily function   #Lactic acidosis  #Hyponatremia   #Morbid Obesity BMI (kg/m2): 32.6  #COPD home O2  #DM   #Abx allergy: penicillin (Unknown)    Creatinine, Serum: 1.9 (04-12-22 @ 05:00)  CrCL 45  Weight (kg): 91.5 (04-11-22 @ 17:00)    RECOMMENDATIONS  This is an incomplete consult note. All final recommendations to follow after interview and examination of the patient. Please follow recommendations noted below.  - D/C Cefepime--> Start Ceftriaxone 2 g IV every 12 hours  - D/C levaquin   - Continue vancomycin  IVPB 1250 milliGRAM(s) IV Intermittent every 24 hours  - Check AM level 4/12  - f/u Strep sensitivities MICs  - Repeat BCX  - TTE  - HIV Ab/Ag     If any questions, please call or send a message on [employer] Teams  Please continue to update ID with any pertinent new laboratory or radiographic findings  Spectra [id number]     ASSESSMENT  71 year old male with a hx of BMT for leukemia, CHF (unspecified), COPD (5L [place] home oxygen), CAD, DM presenting with dyspnea. Beginning almost one month ago he began to feel chills, fatigue, body aches, associated with a nonproductive cough. He went to [facility] 2 weeks ago due to these symptoms was diagnosed with pneumonia. Was discharged on a course of azithromycin. Symptoms persisted. Then starting 2-3 days ago, started to develop dyspnea at rest    IMPRESSION  #Streptococcus pneumoniae bacteremia with PNA and Severe Sepsis on admission T>101 P>90 WBC 24 ADITI lactic acidosis     4/11 BCX strep pneumo    Procalcitonin, Serum: 8.22 ng/mL (04-11-22 @ 16:00)    4/11 pleural   No polymorphonuclear leukocytes seen    No organisms seen    CT CHest 1.  No CT evidence of acute pulmonary embolus.  2.  Moderate size multiloculated right pleural effusions.  3.  Conglomerate nodes versus a central mass surrounding the proximal right middle lobe bronchi.  4.  Numerous other pulmonary nodules measuring up to 9 mm as described above. Mediastinal and bilateral hilar lymphadenopathy. Possible   neoplastic process suspected.  5.  Ill-defined sclerosis in the left posterior sixth rib.  #Severe Sepsis on admission T<96.8F, T>101F, Pulse>90, Resp Rate>20, WBC>12, wbc<4, Bands>10%, lactic acidosis, metabolic encephalopathy, metabolic acidosis, metabolic alkalosis, ADITI due to suspected Gram negative pneumonia, aspiration pneumonia, pyelonephritis, cystitis, cellulitis, bacteremia  Pt has an acute illness which poses threat to bodily function   #Lactic acidosis  #Hyponatremia   #Hx BMT   #Morbid Obesity BMI (kg/m2): 32.6  #COPD home O2  #DM   #Abx allergy: penicillin (Unknown)    Creatinine, Serum: 1.9 (04-12-22 @ 05:00)  CrCL 45  Weight (kg): 91.5 (04-11-22 @ 17:00)    RECOMMENDATIONS  - D/C Cefepime--> Start Ceftriaxone 2 g IV every 12 hours  - D/C levaquin   - Continue vancomycin  IVPB 1250 milliGRAM(s) IV Intermittent every 24 hours  - Check AM level 4/12  - f/u Strep sensitivities MICs  - Repeat BCX  - TTE  - HIV Ab/Ag     If any questions, please call or send a message on [employer] Teams  Please continue to update ID with any pertinent new laboratory or radiographic findings  Spectra [id number]

## 2022-04-12 NOTE — CHART NOTE - NSCHARTNOTEFT_GEN_A_CORE
RN called and informed that the patient had PVCs  The patient was having PVCs every other beat  HD stable  Patient at bedside reports having cardiac history but does not know exact conditions or medications  Denies any chest pains, shortness of breath or palpitations  12 lead EKG obtained, 1 PVC only seen  Case discussed with cardiology fellow, monitor for now, obtain 2D echo  Med rec reviewed, patient does not seem to be on BB or other therapies for CHF ( apart from Aldactone, unknown why on aldactone)

## 2022-04-12 NOTE — PROGRESS NOTE ADULT - ASSESSMENT
IMPRESSION:    Sepsis POA  SP therapy for pneumonia  Right pleural effusion SP thoracentesis   G+ bacteremia  Acute hypoxemic respiratory failure  HO COPD on home O2   ADITI improving     PLAN:    CNS:  No depressants     HEENT: Oral care    PULMONARY:  HOB @ 45 degrees.  Aspiration precautions.  Wean O2 as tolerated.  FU PFA.  Nebs PRN.  Continue home inhalers     CARDIOVASCULAR:  ECHO.  NS 75 cc per hour.     GI: GI prophylaxis.  Feeding.  Bowel regimen     RENAL:  Follow up lytes.  Correct as needed.  Bladder scans     INFECTIOUS DISEASE: Follow up cultures.  Cefepime, Levaquin and Vanc.      HEMATOLOGICAL:  DVT prophylaxis.  Dimer     ENDOCRINE:  Follow up FS.  Insulin protocol if needed.    MUSCULOSKELETAL:  OOB to chair     MICU

## 2022-04-12 NOTE — CONSULT NOTE ADULT - SUBJECTIVE AND OBJECTIVE BOX
CLAUDIA PINTO  71y, Male  Allergy: contrast media (iodine-based) (Other)  penicillin (Unknown)      CHIEF COMPLAINT:       LOS  1d    HPI  HPI:  71 year old male with a hx of CHF (unspecified), COPD (5L [place] home oxygen), CAD, DM presenting with dyspnea. Beginning almost one month ago he began to feel chills, fatigue, body aches, associated with a nonproductive cough. He went to [facility] 2 weeks ago due to these symptoms was diagnosed with pneumonia. Was discharged on a course of azithromycin. Symptoms persisted. Then starting 2-3 days ago, started to develop dyspnea at rest. Denies any chest pain, wheezing, abdominal pain, or n/v. Did endorse increased swelling in lower extremities Called EMS, was found to be saturating 80s on 5L. Was placed on 8L and brought to the ED.   In the ED VITALS: Temp 100.1F, , BP 97/64, RR 22, SpO2 94% on 8L. Began to desaturate further and was placed on HFNC. WBC 24.69. Troponin 0.06, BNP 02089. CT chest: 1.  No CT evidence of acute pulmonary embolus.2.  Moderate size multiloculated right pleural effusions.3.  Conglomerate nodes versus a central mass surrounding the proximal right middle lobe bronchi.4.  Numerous other pulmonary nodules measuring up to 9 mm as described above. Mediastinal and bilateral hilar lymphadenopathy. Possible neoplastic process suspected.5.  Ill-defined sclerosis in the left posterior sixth rib. Effusion to be drained in ED. Will be admitted to ICU.      (2022 14:21)      INFECTIOUS DISEASE HISTORY:  ID consulted for sepsis- BCX with Strep pneumo   Recent outpatient azithro course  CT with R effusion , other findings suspicion for malignancy   WBC 24      PMH  PAST MEDICAL & SURGICAL HISTORY:  History of COPD    CAD (coronary atherosclerotic disease)    DM (diabetes mellitus)    Heart failure    History of surgical removal of testicle        FAMILY HISTORY  non-contributory     SOCIAL HISTORY  Social History:  former 20 pack year smoker, quit 20 years ago   Denies ETOH use   Denies recreational Drug use (2022 14:21)        ROS  ***    VITALS:  T(F): 98.4, Max: 102.4 (22 @ 20:00)  HR: 100  BP: 105/73  RR: 22Vital Signs Last 24 Hrs  T(C): 36.9 (2022 04:00), Max: 39.1 (2022 20:00)  T(F): 98.4 (2022 04:00), Max: 102.4 (2022 20:00)  HR: 100 (2022 09:00) (90 - 100)  BP: 105/73 (2022 09:00) (73/52 - 119/75)  BP(mean): 81 (2022 09:00) (58 - 90)  RR: 22 (2022 09:00) (14 - 39)  SpO2: 94% (2022 09:00) (91% - 97%)    PHYSICAL EXAM:  ***    TESTS & MEASUREMENTS:                        11.5   19.06 )-----------( 198      ( 2022 05:00 )             35.2     04-12    137  |  96<L>  |  33<H>  ----------------------------<  130<H>  4.2   |  23  |  1.9<H>    Ca    9.1      2022 05:00  Mg     1.6     04-12    TPro  5.1<L>  /  Alb  3.3<L>  /  TBili  0.9  /  DBili  x   /  AST  16  /  ALT  9   /  AlkPhos  89  04-11      LIVER FUNCTIONS - ( 2022 23:40 )  Alb: 3.3 g/dL / Pro: 5.1 g/dL / ALK PHOS: 89 U/L / ALT: 9 U/L / AST: 16 U/L / GGT: x           Urinalysis Basic - ( 2022 09:59 )    Color: Yellow / Appearance: Clear / S.010 / pH: x  Gluc: x / Ketone: Negative  / Bili: Negative / Urobili: <2 mg/dL   Blood: x / Protein: Negative / Nitrite: Negative   Leuk Esterase: Negative / RBC: x / WBC x   Sq Epi: x / Non Sq Epi: x / Bacteria: x        Culture - Body Fluid with Gram Stain (collected 22 @ 16:40)  Source: .Body Fluid Pleural Fluid  Gram Stain (22 @ 03:20):    No polymorphonuclear leukocytes seen    No organisms seen    by cytocentrifuge    Culture - Blood (collected 22 @ 09:09)  Source: .Blood Blood-Peripheral  Gram Stain (22 @ 04:43):    Growth in aerobic bottle: Gram Positive Cocci in Pairs and Chains  Preliminary Report (22 @ 04:43):    Growth in aerobic bottle: Gram Positive Cocci in Pairs and Chains    ***Blood Panel PCR results on this specimen are available    approximately 3 hours after the Gram stain result.***    Gram stain, PCR, and/or culture results may not always    correspond due to difference in methodologies.    ************************************************************    This PCR assay was performed by multiplex PCR. This    Assay tests for 66 bacterial and resistance gene targets.    Please refer to the [facility] test directory    at [email or web address] for details.  Organism: Blood Culture PCR (22 @ 06:34)  Organism: Blood Culture PCR (22 @ 06:34)      -  Streptococcus pneumoniae: Detec      Method Type: PCR        Lactate, Blood: 2.4 mmol/L (22 @ 11:20)  Blood Gas Venous - Lactate: 2.80 mmol/L (22 @ 08:51)      INFECTIOUS DISEASES TESTING  Procalcitonin, Serum: 8.22 ng/mL (22 @ 16:00)  COVID-19 PCR: NotDetec (22 @ 09:09)      INFLAMMATORY MARKERS      RADIOLOGY & ADDITIONAL TESTS:  I have personally reviewed the last Chest xray  CXR      CT  CT Angio Chest PE Protocol w/ IV Cont:   ACC: [id number] EXAM:  CT ANGIO CHEST PULM [facility]                          PROCEDURE DATE:  2022          INTERPRETATION:  CLINICAL HISTORY / REASON FOR EXAM: High pretest   probability for pulmonary embolism. Shortness of breath.    TECHNIQUE: Multislice helical sections were obtained from the thoracic   inlet to the lung bases during rapid administration of 60cc Optiray 320   intravenous contrast using a CTA protocol. Thin sections were   reconstructed through the pulmonary vasculature. Sagittal and coronal   reformatted images were acquired, as well as MIP reconstructed images.    COMPARISON: None available.    FINDINGS:  Pulmonary embolus: No CT evidence of acute pulmonary embolus.    Lungs/Airways/Pleura: Moderate size multiloculated right pleural   effusions. No pneumothorax.    Conglomerate nodes versus central mass surrounding the right middle lobe   bronchi (series 11 image 158).    Superior segment right lower lobe 6 mm nodule, possibly intrafissural   (series 11 image 102). Left upper lobe lingula 9 mm nodule abutting the   pericardium (series 11 image 144). Left lower lobe 8 mm nodule (series 11   image 215). Right upper lobe 5 mm nodule abutting the mediastinum (series   11 image 63).    Heart/Vascular: Reflux ofcontrast into the IVC may indicate a component   of right heart dysfunction. Right ventricular prominence.    Mediastinum/Lymph nodes: Mediastinal and bilateral hilar lymph nodes. For   example:  -Right upper paratracheal 1.2 cm  -Para-aortic 0.9 cm  -Right paraesophageal 1.2 cm  -Right hilar nodes measuring up to 1.3 cm  -Left hilar nodes measuring up to 1.2 cm    Visualized upper abdomen: No focal abnormality.    Bones/soft tissues: Degenerative and scoliotic changes throughout the   vertebral column. Ill-defined sclerosis left posterior sixth rib (series   11 image 114).    IMPRESSION:  1.  No CT evidence of acute pulmonary embolus.  2.  Moderate size multiloculated right pleural effusions.  3.  Conglomerate nodes versus a central mass surrounding the proximal   right middle lobe bronchi.  4.  Numerous other pulmonary nodules measuring up to 9 mm as described   above. Mediastinal and bilateral hilar lymphadenopathy. Possible   neoplastic process suspected.  5.  Ill-defined sclerosis inthe left posterior sixth rib.    --- End of Report ---            FRANKY DE GUZMAN MD; Attending Radiologist  This document has been electronically signed. 2022 11:45AM (22 @ 11:04)      CARDIOLOGY TESTING  12 Lead ECG:   Ventricular Rate 92 BPM    Atrial Rate 92 BPM    P-R Interval 164 ms    QRS Duration 92 ms    Q-T Interval 396 ms    QTC Calculation(Bazett) 489 ms    P Axis 45 degrees    R Axis -62 degrees    T Axis 148 degrees    Diagnosis Line Normal sinus rhythm  Left axis deviation  ST & T wave abnormality, consider anterolateral ischemia  Prolonged QT  Abnormal ECG    Confirmed by Sweetie Garcia MD ([id number]) on 2022 8:07:10 AM (22 @ 13:54)      MEDICATIONS  acyclovir   Oral Tab/Cap 400 Oral two times a day  aspirin enteric coated 81 Oral daily  cefepime   IVPB     cefepime   IVPB 2000 IV Intermittent once  cefepime   IVPB 2000 IV Intermittent every 12 hours  chlorhexidine 4% Liquid 1 Topical <User Schedule>  cholecalciferol 1000 Oral daily  DULoxetine 20 Oral daily  folic acid 1 Oral daily  gabapentin 600 Oral daily  lactated ringers. 500 IV Continuous <Continuous>  levoFLOXacin IVPB 750 IV Intermittent every 48 hours  loratadine 10 Oral daily  methadone    Tablet 5 Oral two times a day  montelukast 10 Oral daily  multivitamin Oral Tab/Cap - Peds 1 Oral daily  pantoprazole    Tablet 40 Oral before breakfast  sodium chloride 0.9%. 1000 IV Continuous <Continuous>  vancomycin  IVPB 1250 IV Intermittent every 24 hours        ANTIBIOTICS:  acyclovir   Oral Tab/Cap 400 milliGRAM(s) Oral two times a day  cefepime   IVPB      cefepime   IVPB 2000 milliGRAM(s) IV Intermittent once  cefepime   IVPB 2000 milliGRAM(s) IV Intermittent every 12 hours  levoFLOXacin IVPB 750 milliGRAM(s) IV Intermittent every 48 hours  vancomycin  IVPB 1250 milliGRAM(s) IV Intermittent every 24 hours      ALLERGIES:  contrast media (iodine-based) (Other)  penicillin (Unknown)       CLAUIDA PINTO  71y, Male  Allergy: contrast media (iodine-based) (Other)  penicillin (Unknown)      CHIEF COMPLAINT:       LOS  1d    HPI  HPI:  71 year old male with a hx of BMT for leukemia, CHF (unspecified), COPD (5L [place] home oxygen), CAD, DM presenting with dyspnea. Beginning almost one month ago he began to feel chills, fatigue, body aches, associated with a nonproductive cough. He went to [facility] weeks ago due to these symptoms was diagnosed with pneumonia. Was discharged on a course of azithromycin. Symptoms persisted. Then starting 2-3 days ago, started to develop dyspnea at rest. Denies any chest pain, wheezing, abdominal pain, or n/v. Did endorse increased swelling in lower extremities Called EMS, was found to be saturating 80s on 5L. Was placed on 8L and brought to the ED.   In the ED VITALS: Temp 100.1F, , BP 97/64, RR 22, SpO2 94% on 8L. Began to desaturate further and was placed on HFNC. WBC 24.69. Troponin 0.06, BNP 19159. CT chest: 1.  No CT evidence of acute pulmonary embolus.2.  Moderate size multiloculated right pleural effusions.3.  Conglomerate nodes versus a central mass surrounding the proximal right middle lobe bronchi.4.  Numerous other pulmonary nodules measuring up to 9 mm as described above. Mediastinal and bilateral hilar lymphadenopathy. Possible neoplastic process suspected.5.  Ill-defined sclerosis in the left posterior sixth rib. Effusion to be drained in ED. Will be admitted to ICU.      (2022 14:21)      INFECTIOUS DISEASE HISTORY:  ID consulted for sepsis- BCX with Strep pneumo   Recent outpatient azithro course  CT with R effusion , other findings suspicion for malignancy   WBC 24  denies HA  Hx BMT at [facility]  PAST MEDICAL & SURGICAL HISTORY:  History of COPD    CAD (coronary atherosclerotic disease)    DM (diabetes mellitus)    Heart failure    History of surgical removal of testicle        FAMILY HISTORY  non-contributory     SOCIAL HISTORY  Social History:  former 20 pack year smoker, quit 20 years ago   Denies ETOH use   Denies recreational Drug use (2022 14:21)        ROS  General: Denies rigors, nightsweats  HEENT: as noted above   CV: Denies CP, palpitations  PULM: Denies wheezing, hemoptysis  GI: Denies hematemesis, hematochezia, melena  : Denies discharge, hematuria  MSK: Denies arthralgias, myalgias  SKIN: Denies rash, lesions  NEURO: Denies paresthesias, weakness  PSYCH: Denies depression, anxiety      VITALS:  T(F): 98.4, Max: 102.4 (22 @ 20:00)  HR: 100  BP: 105/73  RR: 22Vital Signs Last 24 Hrs  T(C): 36.9 (2022 04:00), Max: 39.1 (2022 20:00)  T(F): 98.4 (2022 04:00), Max: 102.4 (2022 20:00)  HR: 100 (2022 09:00) (90 - 100)  BP: 105/73 (2022 09:00) (73/52 - 119/75)  BP(mean): 81 (2022 09:00) (58 - 90)  RR: 22 (2022 09:00) (14 - 39)  SpO2: 94% (2022 09:00) (91% - 97%)    PHYSICAL EXAM:  Gen: NAD, resting in bed on NC  HEENT: Normocephalic, atraumatic  Neck: supple, no lymphadenopathy  CV: Regular rate & regular rhythm  Lungs: decreased BS at bases, no fremitus  Abdomen: Soft, BS present  Ext: Warm, well perfused  Neuro: non focal, awake  Skin: no rash, no erythema  Lines: no phlebitis     TESTS & MEASUREMENTS:                        11.5   19.06 )-----------( 198      ( 2022 05:00 )             35.2     04-12    137  |  96<L>  |  33<H>  ----------------------------<  130<H>  4.2   |  23  |  1.9<H>    Ca    9.1      2022 05:00  Mg     1.6     04-12    TPro  5.1<L>  /  Alb  3.3<L>  /  TBili  0.9  /  DBili  x   /  AST  16  /  ALT  9   /  AlkPhos  89  04-11      LIVER FUNCTIONS - ( 2022 23:40 )  Alb: 3.3 g/dL / Pro: 5.1 g/dL / ALK PHOS: 89 U/L / ALT: 9 U/L / AST: 16 U/L / GGT: x           Urinalysis Basic - ( 2022 09:59 )    Color: Yellow / Appearance: Clear / S.010 / pH: x  Gluc: x / Ketone: Negative  / Bili: Negative / Urobili: <2 mg/dL   Blood: x / Protein: Negative / Nitrite: Negative   Leuk Esterase: Negative / RBC: x / WBC x   Sq Epi: x / Non Sq Epi: x / Bacteria: x        Culture - Body Fluid with Gram Stain (collected 22 @ 16:40)  Source: .Body Fluid Pleural Fluid  Gram Stain (22 @ 03:20):    No polymorphonuclear leukocytes seen    No organisms seen    by cytocentrifuge    Culture - Blood (collected 22 @ 09:09)  Source: .Blood Blood-Peripheral  Gram Stain (22 @ 04:43):    Growth in aerobic bottle: Gram Positive Cocci in Pairs and Chains  Preliminary Report (22 @ 04:43):    Growth in aerobic bottle: Gram Positive Cocci in Pairs and Chains    ***Blood Panel PCR results on this specimen are available    approximately 3 hours after the Gram stain result.***    Gram stain, PCR, and/or culture results may not always    correspond due to difference in methodologies.    ************************************************************    This PCR assay was performed by multiplex PCR. This    Assay tests for 66 bacterial and resistance gene targets.    Please refer to the [facility] test directory    at [email or web address] for details.  Organism: Blood Culture PCR (22 @ 06:34)  Organism: Blood Culture PCR (22 @ 06:34)      -  Streptococcus pneumoniae: Detec      Method Type: PCR        Lactate, Blood: 2.4 mmol/L (22 @ 11:20)  Blood Gas Venous - Lactate: 2.80 mmol/L (22 @ 08:51)      INFECTIOUS DISEASES TESTING  Procalcitonin, Serum: 8.22 ng/mL (22 @ 16:00)  COVID-19 PCR: NotDetec (22 @ 09:09)      INFLAMMATORY MARKERS      RADIOLOGY & ADDITIONAL TESTS:  I have personally reviewed the last Chest xray  CXR      CT  CT Angio Chest PE Protocol w/ IV Cont:   ACC: [id number] EXAM:  CT ANGIO CHEST PULM [facility]                          PROCEDURE DATE:  2022          INTERPRETATION:  CLINICAL HISTORY / REASON FOR EXAM: High pretest   probability for pulmonary embolism. Shortness of breath.    TECHNIQUE: Multislice helical sections were obtained from the thoracic   inlet to the lung bases during rapid administration of 60cc Optiray 320   intravenous contrast using a CTA protocol. Thin sections were   reconstructed through the pulmonary vasculature. Sagittal and coronal   reformatted images were acquired, as well as MIP reconstructed images.    COMPARISON: None available.    FINDINGS:  Pulmonary embolus: No CT evidence of acute pulmonary embolus.    Lungs/Airways/Pleura: Moderate size multiloculated right pleural   effusions. No pneumothorax.    Conglomerate nodes versus central mass surrounding the right middle lobe   bronchi (series 11 image 158).    Superior segment right lower lobe 6 mm nodule, possibly intrafissural   (series 11 image 102). Left upper lobe lingula 9 mm nodule abutting the   pericardium (series 11 image 144). Left lower lobe 8 mm nodule (series 11   image 215). Right upper lobe 5 mm nodule abutting the mediastinum (series   11 image 63).    Heart/Vascular: Reflux ofcontrast into the IVC may indicate a component   of right heart dysfunction. Right ventricular prominence.    Mediastinum/Lymph nodes: Mediastinal and bilateral hilar lymph nodes. For   example:  -Right upper paratracheal 1.2 cm  -Para-aortic 0.9 cm  -Right paraesophageal 1.2 cm  -Right hilar nodes measuring up to 1.3 cm  -Left hilar nodes measuring up to 1.2 cm    Visualized upper abdomen: No focal abnormality.    Bones/soft tissues: Degenerative and scoliotic changes throughout the   vertebral column. Ill-defined sclerosis left posterior sixth rib (series   11 image 114).    IMPRESSION:  1.  No CT evidence of acute pulmonary embolus.  2.  Moderate size multiloculated right pleural effusions.  3.  Conglomerate nodes versus a central mass surrounding the proximal   right middle lobe bronchi.  4.  Numerous other pulmonary nodules measuring up to 9 mm as described   above. Mediastinal and bilateral hilar lymphadenopathy. Possible   neoplastic process suspected.  5.  Ill-defined sclerosis inthe left posterior sixth rib.    --- End of Report ---            FRANKY DE GUZMAN MD; Attending Radiologist  This document has been electronically signed. 2022 11:45AM (22 @ 11:04)      CARDIOLOGY TESTING  12 Lead ECG:   Ventricular Rate 92 BPM    Atrial Rate 92 BPM    P-R Interval 164 ms    QRS Duration 92 ms    Q-T Interval 396 ms    QTC Calculation(Bazett) 489 ms    P Axis 45 degrees    R Axis -62 degrees    T Axis 148 degrees    Diagnosis Line Normal sinus rhythm  Left axis deviation  ST & T wave abnormality, consider anterolateral ischemia  Prolonged QT  Abnormal ECG    Confirmed by Sweetie Garcia MD ([id number]) on 2022 8:07:10 AM (22 @ 13:54)      MEDICATIONS  acyclovir   Oral Tab/Cap 400 Oral two times a day  aspirin enteric coated 81 Oral daily  cefepime   IVPB     cefepime   IVPB 2000 IV Intermittent once  cefepime   IVPB 2000 IV Intermittent every 12 hours  chlorhexidine 4% Liquid 1 Topical <User Schedule>  cholecalciferol 1000 Oral daily  DULoxetine 20 Oral daily  folic acid 1 Oral daily  gabapentin 600 Oral daily  lactated ringers. 500 IV Continuous <Continuous>  levoFLOXacin IVPB 750 IV Intermittent every 48 hours  loratadine 10 Oral daily  methadone    Tablet 5 Oral two times a day  montelukast 10 Oral daily  multivitamin Oral Tab/Cap - Peds 1 Oral daily  pantoprazole    Tablet 40 Oral before breakfast  sodium chloride 0.9%. 1000 IV Continuous <Continuous>  vancomycin  IVPB 1250 IV Intermittent every 24 hours        ANTIBIOTICS:  acyclovir   Oral Tab/Cap 400 milliGRAM(s) Oral two times a day  cefepime   IVPB      cefepime   IVPB 2000 milliGRAM(s) IV Intermittent once  cefepime   IVPB 2000 milliGRAM(s) IV Intermittent every 12 hours  levoFLOXacin IVPB 750 milliGRAM(s) IV Intermittent every 48 hours  vancomycin  IVPB 1250 milliGRAM(s) IV Intermittent every 24 hours      ALLERGIES:  contrast media (iodine-based) (Other)  penicillin (Unknown)

## 2022-04-12 NOTE — PROGRESS NOTE ADULT - SUBJECTIVE AND OBJECTIVE BOX
Patient is a 71y old  Male who presents with a chief complaint of       Over Night Events:  SP right thoracentesis.  Remains on HFNCO2.  Off pressors.          ROS:     All ROS are negative except HPI         PHYSICAL EXAM    ICU Vital Signs Last 24 Hrs  T(C): 36.9 (2022 04:00), Max: 39.6 (2022 08:56)  T(F): 98.4 (2022 04:00), Max: 103.3 (2022 08:56)  HR: 94 (2022 06:00) (90 - 103)  BP: 102/72 (2022 06:00) (73/52 - 119/75)  BP(mean): 88 (2022 06:00) (58 - 90)  ABP: --  ABP(mean): --  RR: 18 (2022 06:00) (14 - 39)  SpO2: 95% (2022 07:49) (91% - 97%)      CONSTITUTIONAL:  Well nourished.  NAD    ENT:   Airway patent,   Mouth with normal mucosa.   No thrush    EYES:   Pupils equal,   Round and reactive to light.    CARDIAC:   Normal rate,   Regular rhythm.    No edema      Vascular:  Normal systolic impulse  No Carotid bruits    RESPIRATORY:   No wheezing  Right crackles   Normal chest expansion  Not tachypneic,  No use of accessory muscles    GASTROINTESTINAL:  Abdomen soft,   Non-tender,   No guarding,   + BS    MUSCULOSKELETAL:   Range of motion is not limited,  No clubbing, cyanosis    NEUROLOGICAL:   Alert and oriented   No motor  deficits.    SKIN:   Skin normal color for race,   No evidence of rash.    PSYCHIATRIC:   No apparent risk to self or others.    HEMATOLOGICAL:  No cervical  lymphadenopathy.  no inguinal lymphadenopathy      22 @ 07:01  -  22 @ 07:00  --------------------------------------------------------  IN:    IV PiggyBack: 400 mL    Lactated Ringers: 500 mL    Oral Fluid: 100 mL  Total IN: 1000 mL    OUT:    Voided (mL): 401 mL  Total OUT: 401 mL    Total NET: 599 mL          LABS:                            11.5   19.06 )-----------( 198      ( 2022 05:00 )             35.2                                                   137  |  96<L>  |  33<H>  ----------------------------<  130<H>  4.2   |  23  |  1.9<H>    Creatinine Trend  BUN 33, Cr 1.9, (22 @ 05:00)  Creatinine Trend  BUN 32, Cr 1.9, (22 @ 23:40)  Creatinine Trend  BUN 30, Cr 2.2, (22 @ 09:09)      Ca    9.1      2022 05:00  Mg     1.6         TPro  5.1<L>  /  Alb  3.3<L>  /  TBili  0.9  /  DBili  x   /  AST  16  /  ALT  9   /  AlkPhos  89  04-11      PT/INR - ( 2022 09:09 )   PT: 13.50 sec;   INR: 1.18 ratio         PTT - ( 2022 09:09 )  PTT:23.4 sec                                       Urinalysis Basic - ( 2022 09:59 )    Color: Yellow / Appearance: Clear / S.010 / pH: x  Gluc: x / Ketone: Negative  / Bili: Negative / Urobili: <2 mg/dL   Blood: x / Protein: Negative / Nitrite: Negative   Leuk Esterase: Negative / RBC: x / WBC x   Sq Epi: x / Non Sq Epi: x / Bacteria: x        CARDIAC MARKERS ( 2022 05:00 )  x     / 0.03 ng/mL / x     / x     / x      CARDIAC MARKERS ( 2022 21:43 )  x     / 0.04 ng/mL / x     / x     / x      CARDIAC MARKERS ( 2022 10:15 )  x     / 0.06 ng/mL / x     / x     / x                                                LIVER FUNCTIONS - ( 2022 23:40 )  Alb: 3.3 g/dL / Pro: 5.1 g/dL / ALK PHOS: 89 U/L / ALT: 9 U/L / AST: 16 U/L / GGT: x                                                  Culture - Body Fluid with Gram Stain (collected 2022 16:40)  Source: .Body Fluid Pleural Fluid  Gram Stain (2022 03:20):    No polymorphonuclear leukocytes seen    No organisms seen    by cytocentrifuge    Culture - Blood (collected 2022 09:09)  Source: .Blood Blood-Peripheral  Gram Stain (2022 04:43):    Growth in aerobic bottle: Gram Positive Cocci in Pairs and Chains  Preliminary Report (2022 04:43):    Growth in aerobic bottle: Gram Positive Cocci in Pairs and Chains    ***Blood Panel PCR results on this specimen are available    approximately 3 hours after the Gram stain result.***    Gram stain, PCR, and/or culture results may not always    correspond due to difference in methodologies.    ************************************************************    This PCR assay was performed by multiplex PCR. This    Assay tests for 66 bacterial and resistance gene targets.    Please refer to the  Labs test directory    at [email or web address] for details.  Organism: Blood Culture PCR (2022 06:34)  Organism: Blood Culture PCR (2022 06:34)                                                                                           MEDICATIONS  (STANDING):  acyclovir   Oral Tab/Cap 400 milliGRAM(s) Oral two times a day  aspirin enteric coated 81 milliGRAM(s) Oral daily  chlorhexidine 4% Liquid 1 Application(s) Topical <User Schedule>  cholecalciferol 1000 Unit(s) Oral daily  DULoxetine 20 milliGRAM(s) Oral daily  folic acid 1 milliGRAM(s) Oral daily  gabapentin 600 milliGRAM(s) Oral daily  lactated ringers. 500 milliLiter(s) (1000 mL/Hr) IV Continuous <Continuous>  levoFLOXacin IVPB 750 milliGRAM(s) IV Intermittent every 48 hours  loratadine 10 milliGRAM(s) Oral daily  methadone    Tablet 5 milliGRAM(s) Oral two times a day  montelukast 10 milliGRAM(s) Oral daily  multivitamin Oral Tab/Cap - Peds 1 Tablet(s) Oral daily  pantoprazole    Tablet 40 milliGRAM(s) Oral before breakfast  vancomycin  IVPB 1250 milliGRAM(s) IV Intermittent every 24 hours    MEDICATIONS  (PRN):  acetaminophen     Tablet .. 650 milliGRAM(s) Oral every 6 hours PRN Temp greater or equal to 38C (100.4F), Moderate Pain (4 - 6)  oxyCODONE    IR 5 milliGRAM(s) Oral every 4 hours PRN Moderate Pain (4 - 6)      New X-rays reviewed:                                                                                  ECHO    CXR interpreted by me:  Right sided opacities

## 2022-04-12 NOTE — PROGRESS NOTE ADULT - SUBJECTIVE AND OBJECTIVE BOX
CLAUDIA PINTO 71y Male  MRN#: [id number]   CODE STATUS: FULL    Hospital Day: 1d    Pt is currently admitted with the primary diagnosis of sepsis    SUBJECTIVE  Hospital Course:  71 year old male with a hx of CHF (unspecified), COPD (5L [place] home oxygen), CAD, DM presenting with dyspnea. Beginning almost one month ago he began to feel chills, fatigue, body aches, associated with a nonproductive cough. He went to [facility] 2 weeks ago due to these symptoms was diagnosed with pneumonia. Was discharged on a course of azithromycin. Symptoms persisted. Then starting 2-3 days ago, started to develop dyspnea at rest. Denies any chest pain, wheezing, abdominal pain, or n/v. Did endorse increased swelling in lower extremities Called EMS, was found to be saturating 80s on 5L. Was placed on 8L and brought to the ED.   In the ED VITALS: Temp 100.1F, , BP 97/64, RR 22, SpO2 94% on 8L. Began to desaturate further and was placed on HFNC. WBC 24.69. Troponin 0.06, BNP 45290.   CT chest: 1.  No CT evidence of acute pulmonary embolus.2.  Moderate size multiloculated right pleural effusions.3.  Conglomerate nodes versus a central mass surrounding the proximal right middle lobe bronchi.4.  Numerous other pulmonary nodules measuring up to 9 mm as described above. Mediastinal and bilateral hilar lymphadenopathy. Possible neoplastic process suspected.5.  Ill-defined sclerosis in the left posterior sixth rib. Thoracentesis performed in the ED. Admitted to MICU.    Overnight events:   Overnight, pt had an episode of PVCs, cardio was consulted. On EKG, only 1 PVC was seen. Pt was febrile to 102 and hypotensive. S/p 500cc bolus. Pt s/p thoracentesis in ED and hgb dropped from 14.3 to 11.5, Eliquis was stopped due to concern for hemothorax.     Subjective complaints:   Pt denies chest pain, dizziness, N/V. Pt states he is feeling better.      Present Today:   - Hayden:  No [ X ], Yes [   ] : Indication:     - Type of IV Access:       .. CVC/Piccline:  No [ X ], Yes [   ] : Indication:       .. Midline: No [ X ], Yes [   ] : Indication:                                             ----------------------------------------------------------  OBJECTIVE  PAST MEDICAL & SURGICAL HISTORY  History of COPD    CAD (coronary atherosclerotic disease)    DM (diabetes mellitus)    Heart failure    History of surgical removal of testicle                                              -----------------------------------------------------------  ALLERGIES:  contrast media (iodine-based) (Other)  penicillin (Unknown)                                            ------------------------------------------------------------    HOME MEDICATIONS  Home Medications:  acyclovir 400 mg oral tablet: 1 tab(s) orally 2 times a day (2022 15:03)  Aspir 81 oral delayed release tablet: 1 tab(s) orally once a day (:44)  calcium (as calcium citrate) 250 mg oral tablet: 1 tab(s) orally 2 times a day (:44)  cholecalciferol 25 mcg (1000 intl units) oral tablet: 1 tab(s) orally once a day (2022 15:04)  docusate sodium 100 mg oral tablet: 1 tab(s) orally 2 times a day (2022 15:05)  DULoxetine 20 mg oral delayed release capsule: 1 cap(s) orally once a day (:44)  eplerenone 25 mg oral tablet: 1 tab(s) orally once a day (2022 15:06)  folic acid 1 mg oral tablet: 1 tab(s) orally once a day (:44)  Horizant 600 mg oral tablet, extended release: 1 tab(s) orally once a day (2022 15:08)  Lasix 40 mg oral tablet: 1 tab(s) orally once a day (2022 15:21)  loratadine 10 mg oral tablet: 1 tab(s) orally once a day (2022 09:44)  loteprednol 0.2% ophthalmic suspension: 1 drop(s) to each affected eye 2 times a day, As Needed (2022 15:09)  metFORMIN 500 mg oral tablet: 1 tab(s) orally 2 times a day (:44)  methadone 5 mg oral tablet: orally 2 times a day (:44)  montelukast 10 mg oral tablet: 1 tab(s) orally once a day (:44)  multivitamin:  (:44)  oxyCODONE 5 mg oral tablet: 1 tab(s) orally every 4 hours, As Needed (2022 15:02)  Tyvaso 0.6 mg/mL inhalation solution: 9 puff(s) inhaled 4 times a day (2022 09:44)                           MEDICATIONS:  STANDING MEDICATIONS  acyclovir   Oral Tab/Cap 400 milliGRAM(s) Oral two times a day  aspirin enteric coated 81 milliGRAM(s) Oral daily  cefepime   IVPB      cefepime   IVPB 2000 milliGRAM(s) IV Intermittent once  cefepime   IVPB 2000 milliGRAM(s) IV Intermittent every 12 hours  chlorhexidine 4% Liquid 1 Application(s) Topical <User Schedule>  cholecalciferol 1000 Unit(s) Oral daily  DULoxetine 20 milliGRAM(s) Oral daily  folic acid 1 milliGRAM(s) Oral daily  gabapentin 600 milliGRAM(s) Oral daily  lactated ringers. 500 milliLiter(s) IV Continuous <Continuous>  levoFLOXacin IVPB 750 milliGRAM(s) IV Intermittent every 48 hours  loratadine 10 milliGRAM(s) Oral daily  methadone    Tablet 5 milliGRAM(s) Oral two times a day  montelukast 10 milliGRAM(s) Oral daily  multivitamin Oral Tab/Cap - Peds 1 Tablet(s) Oral daily  pantoprazole    Tablet 40 milliGRAM(s) Oral before breakfast  sodium chloride 0.9%. 1000 milliLiter(s) IV Continuous <Continuous>  vancomycin  IVPB 1250 milliGRAM(s) IV Intermittent every 24 hours    PRN MEDICATIONS  acetaminophen     Tablet .. 650 milliGRAM(s) Oral every 6 hours PRN  oxyCODONE    IR 5 milliGRAM(s) Oral every 4 hours PRN                                            ------------------------------------------------------------  VITAL SIGNS: Last 24 Hours  T(C): 36.9 (2022 04:00), Max: 39.1 (2022 20:00)  T(F): 98.4 (2022 04:00), Max: 102.4 (2022 20:00)  HR: 100 (2022 09:00) (90 - 100)  BP: 105/73 (2022 09:00) (73/52 - 119/75)  BP(mean): 81 (2022 09:) (58 - 90)  RR: 22 (2022 09:00) (14 - 39)  SpO2: 94% (2022 09:00) (91% - 97%)      22 @ 07:01  -  22 @ 07:00  --------------------------------------------------------  IN: 1000 mL / OUT: 401 mL / NET: 599 mL                                             --------------------------------------------------------------  LABS:                        11.5   19.06 )-----------( 198      ( 2022 05:00 )             35.2     0412    137  |  96<L>  |  33<H>  ----------------------------<  130<H>  4.2   |  23  |  1.9<H>    Ca    9.1      2022 05:00  Mg     1.6     04-12    TPro  5.1<L>  /  Alb  3.3<L>  /  TBili  0.9  /  DBili  x   /  AST  16  /  ALT  9   /  AlkPhos  89  04-11    PT/INR - ( 2022 09:09 )   PT: 13.50 sec;   INR: 1.18 ratio         PTT - ( 2022 09:09 )  PTT:23.4 sec  Urinalysis Basic - ( 2022 09:59 )    Color: Yellow / Appearance: Clear / S.010 / pH: x  Gluc: x / Ketone: Negative  / Bili: Negative / Urobili: <2 mg/dL   Blood: x / Protein: Negative / Nitrite: Negative   Leuk Esterase: Negative / RBC: x / WBC x   Sq Epi: x / Non Sq Epi: x / Bacteria: x        Troponin T, Serum: 0.03 ng/mL *HH* (22 @ 05:00)  Troponin T, Serum: 0.04 ng/mL *HH* (22 @ 21:43)  Lactate, Blood: 2.4 mmol/L *H* (22 @ 11:20)  Troponin T, Serum: 0.06 ng/mL *HH* (22 @ 10:15)        Culture - Body Fluid with Gram Stain (collected 2022 16:40)  Source: .Body Fluid Pleural Fluid  Gram Stain (2022 03:20):    No polymorphonuclear leukocytes seen    No organisms seen    by cytocentrifuge    Culture - Blood (collected 2022 09:09)  Source: .Blood Blood-Peripheral  Gram Stain (2022 04:43):    Growth in aerobic bottle: Gram Positive Cocci in Pairs and Chains  Preliminary Report (2022 04:43):    Growth in aerobic bottle: Gram Positive Cocci in Pairs and Chains    ***Blood Panel PCR results on this specimen are available    approximately 3 hours after the Gram stain result.***    Gram stain, PCR, and/or culture results may not always    correspond due to difference in methodologies.    ************************************************************    This PCR assay was performed by multiplex PCR. This    Assay tests for 66 bacterial and resistance gene targets.    Please refer to the [facility] test directory    at [email or web address] for details.  Organism: Blood Culture PCR (2022 06:34)  Organism: Blood Culture PCR (2022 06:34)        CARDIAC MARKERS ( 2022 05:00 )  x     / 0.03 ng/mL / x     / x     / x      CARDIAC MARKERS ( 2022 21:43 )  x     / 0.04 ng/mL / x     / x     / x      CARDIAC MARKERS ( 2022 10:15 )  x     / 0.06 ng/mL / x     / x     / x                                                --------------------------------------------------------------    PHYSICAL EXAM:  General: NAD, on HFNC, speaking in full sentences   HEENT: NCAT  LUNGS: R-sided crackles, no use of accessory muscles  HEART: RRR, +S1,S2, no murmurs, rubs, or gallops   ABDOMEN: Soft, Non-distended, Non-tender to palpation  EXT: warm, well perfused  NEURO: AAOx3, No FND's noted  SKIN: No new breakdown or rashes noted                                           --------------------------------------------------------------    ASSESSMENT & PLAN  71 year old male with a hx of CHF (unspecified), COPD (5L [place] home oxygen), CAD, DM presenting with dyspnea. Pt reports a 1-month history of chills, fatigue, body aches, associated with a nonproductive cough and was diagnosed with pneumonia 2 weeks ago. Was discharged from [facility] on a course of azithromycin. but symptoms persisted. Then starting 2-3 days ago, started to develop dyspnea at rest.    #Sepsis POA  #G+ bacteremia  -SP azithromycin therapy for pneumonia  -Lactate 2.8 ->2.4 and Procal 8.22  -Leukocytosis of 24.96 on admission -> 19.06   -f/u cultures  -Cefepime, Levaquin and Vanc      #Right pleural effusion SP thoracentesis   -Glu 102, Protein 3.6, , gram stain -ve  -Total nucleated cell count 3690, total RBC count 3000     #Acute hypoxemic respiratory failure  -HO COPD on 5L O2 at home   -currently on HFNCO2, wean as tolerated  -nebs PRN  -continue home inhalers    -ECHO: EF 50-55%, severely enlarged RV, RV volume and pressure overload, severe pulmonary hypertension    #ADITI  -Cr on admission 2.2 -> 1.9, improving                                                                                ----------------------------------------------------  # DVT prophylaxis - Eliquis stopped    # GI prophylaxis - Protonix    # Diet - DASH/TLC    # Code status - FULL            CLAUDIA PINTO 71y Male  MRN#: [id number]   CODE STATUS: FULL    Hospital Day: 1d    Pt is currently admitted with the primary diagnosis of sepsis    SUBJECTIVE  Hospital Course:  71 year old male with a hx of CHF (unspecified), COPD (5L [place] home oxygen), CAD, DM presenting with dyspnea. Beginning almost one month ago he began to feel chills, fatigue, body aches, associated with a nonproductive cough. He went to [facility] 2 weeks ago due to these symptoms was diagnosed with pneumonia. Was discharged on a course of azithromycin. Symptoms persisted. Then starting 2-3 days ago, started to develop dyspnea at rest. Denies any chest pain, wheezing, abdominal pain, or n/v. Did endorse increased swelling in lower extremities Called EMS, was found to be saturating 80s on 5L. Was placed on 8L and brought to the ED.   In the ED VITALS: Temp 100.1F, , BP 97/64, RR 22, SpO2 94% on 8L. Began to desaturate further and was placed on HFNC. WBC 24.69. Troponin 0.06, BNP 04716.   CT chest: 1.  No CT evidence of acute pulmonary embolus.2.  Moderate size multiloculated right pleural effusions.3.  Conglomerate nodes versus a central mass surrounding the proximal right middle lobe bronchi.4.  Numerous other pulmonary nodules measuring up to 9 mm as described above. Mediastinal and bilateral hilar lymphadenopathy. Possible neoplastic process suspected.5.  Ill-defined sclerosis in the left posterior sixth rib. Thoracentesis performed in the ED. Admitted to MICU.    Overnight events:   Overnight, pt had an episode of PVCs, cardio was consulted. On EKG, only 1 PVC was seen. Pt was febrile to 102 and hypotensive. S/p 500cc bolus. Pt s/p thoracentesis in ED and hgb dropped from 14.3 to 11.5, Eliquis was stopped due to concern for hemothorax.     Subjective complaints:   Pt denies chest pain, dizziness, N/V. Pt states he is feeling better.      Present Today:   - Hayden:  No [ X ], Yes [   ] : Indication:     - Type of IV Access:       .. CVC/Piccline:  No [ X ], Yes [   ] : Indication:       .. Midline: No [ X ], Yes [   ] : Indication:                                             ----------------------------------------------------------  OBJECTIVE  PAST MEDICAL & SURGICAL HISTORY  History of COPD    CAD (coronary atherosclerotic disease)    DM (diabetes mellitus)    Heart failure    History of surgical removal of testicle                                              -----------------------------------------------------------  ALLERGIES:  contrast media (iodine-based) (Other)  penicillin (Unknown)                                            ------------------------------------------------------------    HOME MEDICATIONS  Home Medications:  acyclovir 400 mg oral tablet: 1 tab(s) orally 2 times a day (2022 15:03)  Aspir 81 oral delayed release tablet: 1 tab(s) orally once a day (:44)  calcium (as calcium citrate) 250 mg oral tablet: 1 tab(s) orally 2 times a day (:44)  cholecalciferol 25 mcg (1000 intl units) oral tablet: 1 tab(s) orally once a day (2022 15:04)  docusate sodium 100 mg oral tablet: 1 tab(s) orally 2 times a day (2022 15:05)  DULoxetine 20 mg oral delayed release capsule: 1 cap(s) orally once a day (:44)  eplerenone 25 mg oral tablet: 1 tab(s) orally once a day (2022 15:06)  folic acid 1 mg oral tablet: 1 tab(s) orally once a day (:44)  Horizant 600 mg oral tablet, extended release: 1 tab(s) orally once a day (2022 15:08)  Lasix 40 mg oral tablet: 1 tab(s) orally once a day (2022 15:21)  loratadine 10 mg oral tablet: 1 tab(s) orally once a day (2022 09:44)  loteprednol 0.2% ophthalmic suspension: 1 drop(s) to each affected eye 2 times a day, As Needed (2022 15:09)  metFORMIN 500 mg oral tablet: 1 tab(s) orally 2 times a day (:44)  methadone 5 mg oral tablet: orally 2 times a day (:44)  montelukast 10 mg oral tablet: 1 tab(s) orally once a day (:44)  multivitamin:  (:44)  oxyCODONE 5 mg oral tablet: 1 tab(s) orally every 4 hours, As Needed (2022 15:02)  Tyvaso 0.6 mg/mL inhalation solution: 9 puff(s) inhaled 4 times a day (2022 09:44)                           MEDICATIONS:  STANDING MEDICATIONS  acyclovir   Oral Tab/Cap 400 milliGRAM(s) Oral two times a day  aspirin enteric coated 81 milliGRAM(s) Oral daily  cefepime   IVPB      cefepime   IVPB 2000 milliGRAM(s) IV Intermittent once  cefepime   IVPB 2000 milliGRAM(s) IV Intermittent every 12 hours  chlorhexidine 4% Liquid 1 Application(s) Topical <User Schedule>  cholecalciferol 1000 Unit(s) Oral daily  DULoxetine 20 milliGRAM(s) Oral daily  folic acid 1 milliGRAM(s) Oral daily  gabapentin 600 milliGRAM(s) Oral daily  lactated ringers. 500 milliLiter(s) IV Continuous <Continuous>  levoFLOXacin IVPB 750 milliGRAM(s) IV Intermittent every 48 hours  loratadine 10 milliGRAM(s) Oral daily  methadone    Tablet 5 milliGRAM(s) Oral two times a day  montelukast 10 milliGRAM(s) Oral daily  multivitamin Oral Tab/Cap - Peds 1 Tablet(s) Oral daily  pantoprazole    Tablet 40 milliGRAM(s) Oral before breakfast  sodium chloride 0.9%. 1000 milliLiter(s) IV Continuous <Continuous>  vancomycin  IVPB 1250 milliGRAM(s) IV Intermittent every 24 hours    PRN MEDICATIONS  acetaminophen     Tablet .. 650 milliGRAM(s) Oral every 6 hours PRN  oxyCODONE    IR 5 milliGRAM(s) Oral every 4 hours PRN                                            ------------------------------------------------------------  VITAL SIGNS: Last 24 Hours  T(C): 36.9 (2022 04:00), Max: 39.1 (2022 20:00)  T(F): 98.4 (2022 04:00), Max: 102.4 (2022 20:00)  HR: 100 (2022 09:00) (90 - 100)  BP: 105/73 (2022 09:00) (73/52 - 119/75)  BP(mean): 81 (2022 09:) (58 - 90)  RR: 22 (2022 09:00) (14 - 39)  SpO2: 94% (2022 09:00) (91% - 97%)      22 @ 07:01  -  22 @ 07:00  --------------------------------------------------------  IN: 1000 mL / OUT: 401 mL / NET: 599 mL                                             --------------------------------------------------------------  LABS:                        11.5   19.06 )-----------( 198      ( 2022 05:00 )             35.2     0412    137  |  96<L>  |  33<H>  ----------------------------<  130<H>  4.2   |  23  |  1.9<H>    Ca    9.1      2022 05:00  Mg     1.6     04-12    TPro  5.1<L>  /  Alb  3.3<L>  /  TBili  0.9  /  DBili  x   /  AST  16  /  ALT  9   /  AlkPhos  89  04-11    PT/INR - ( 2022 09:09 )   PT: 13.50 sec;   INR: 1.18 ratio         PTT - ( 2022 09:09 )  PTT:23.4 sec  Urinalysis Basic - ( 2022 09:59 )    Color: Yellow / Appearance: Clear / S.010 / pH: x  Gluc: x / Ketone: Negative  / Bili: Negative / Urobili: <2 mg/dL   Blood: x / Protein: Negative / Nitrite: Negative   Leuk Esterase: Negative / RBC: x / WBC x   Sq Epi: x / Non Sq Epi: x / Bacteria: x        Troponin T, Serum: 0.03 ng/mL *HH* (22 @ 05:00)  Troponin T, Serum: 0.04 ng/mL *HH* (22 @ 21:43)  Lactate, Blood: 2.4 mmol/L *H* (22 @ 11:20)  Troponin T, Serum: 0.06 ng/mL *HH* (22 @ 10:15)        Culture - Body Fluid with Gram Stain (collected 2022 16:40)  Source: .Body Fluid Pleural Fluid  Gram Stain (2022 03:20):    No polymorphonuclear leukocytes seen    No organisms seen    by cytocentrifuge    Culture - Blood (collected 2022 09:09)  Source: .Blood Blood-Peripheral  Gram Stain (2022 04:43):    Growth in aerobic bottle: Gram Positive Cocci in Pairs and Chains  Preliminary Report (2022 04:43):    Growth in aerobic bottle: Gram Positive Cocci in Pairs and Chains    ***Blood Panel PCR results on this specimen are available    approximately 3 hours after the Gram stain result.***    Gram stain, PCR, and/or culture results may not always    correspond due to difference in methodologies.    ************************************************************    This PCR assay was performed by multiplex PCR. This    Assay tests for 66 bacterial and resistance gene targets.    Please refer to the [facility] test directory    at [email or web address] for details.  Organism: Blood Culture PCR (2022 06:34)  Organism: Blood Culture PCR (2022 06:34)        CARDIAC MARKERS ( 2022 05:00 )  x     / 0.03 ng/mL / x     / x     / x      CARDIAC MARKERS ( 2022 21:43 )  x     / 0.04 ng/mL / x     / x     / x      CARDIAC MARKERS ( 2022 10:15 )  x     / 0.06 ng/mL / x     / x     / x                                                --------------------------------------------------------------    PHYSICAL EXAM:  General: NAD, on HFNC, speaking in full sentences   HEENT: NCAT  LUNGS: R-sided crackles, no use of accessory muscles  HEART: RRR, +S1,S2, no murmurs, rubs, or gallops   ABDOMEN: Soft, Non-distended, Non-tender to palpation  EXT: warm, well perfused  NEURO: AAOx3, No FND's noted  SKIN: No new breakdown or rashes noted                                           --------------------------------------------------------------    ASSESSMENT & PLAN  71 year old male with a hx of CHF (unspecified), COPD (5L [place] home oxygen), CAD, DM presenting with dyspnea. Pt reports a 1-month history of chills, fatigue, body aches, associated with a nonproductive cough and was diagnosed with pneumonia 2 weeks ago. Was discharged from [facility] on a course of azithromycin. but symptoms persisted. Then starting 2-3 days ago, started to develop dyspnea at rest.    #Sepsis POA  #G+ bacteremia  -SP azithromycin therapy for pneumonia  -Lactate 2.8 ->2.4 and Procal 8.22  -Leukocytosis of 24.96 on admission -> 19.06   -f/u cultures  -Cefepime, Levaquin and Vanc      #Right pleural effusion SP thoracentesis   -Glu 102, Protein 3.6, , gram stain -ve  -Total nucleated cell count 3690, total RBC count 3000     #Acute hypoxemic respiratory failure  -HO COPD on 5L O2 at home   -currently on HFNCO2, wean as tolerated  -nebs PRN  -continue home inhalers    -ECHO: EF 50-55%, severely enlarged RV, RV volume and pressure overload, severe pulmonary hypertension  -f/u dimer    #ADITI  -Cr on admission 2.2 -> 1.9, improving    # DVT prophylaxis - Eliquis stopped    # GI prophylaxis - Protonix    # Diet - DASH/TLC    # Code status - FULL            CLAUDIA PINTO 71y Male  MRN#: [id number]   CODE STATUS: FULL    Hospital Day: 1d    Pt is currently admitted with the primary diagnosis of sepsis    SUBJECTIVE  Hospital Course:  71 year old male with a hx of CHF (unspecified), COPD (5L [place] home oxygen), CAD, DM presenting with dyspnea. Beginning almost one month ago he began to feel chills, fatigue, body aches, associated with a nonproductive cough. He went to [facility] 2 weeks ago due to these symptoms was diagnosed with pneumonia. Was discharged on a course of azithromycin. Symptoms persisted. Then starting 2-3 days ago, started to develop dyspnea at rest. Denies any chest pain, wheezing, abdominal pain, or n/v. Did endorse increased swelling in lower extremities Called EMS, was found to be saturating 80s on 5L. Was placed on 8L and brought to the ED.   In the ED VITALS: Temp 100.1F, , BP 97/64, RR 22, SpO2 94% on 8L. Began to desaturate further and was placed on HFNC. WBC 24.69. Troponin 0.06, BNP 04004.   CT chest: 1.  No CT evidence of acute pulmonary embolus.2.  Moderate size multiloculated right pleural effusions.3.  Conglomerate nodes versus a central mass surrounding the proximal right middle lobe bronchi.4.  Numerous other pulmonary nodules measuring up to 9 mm as described above. Mediastinal and bilateral hilar lymphadenopathy. Possible neoplastic process suspected.5.  Ill-defined sclerosis in the left posterior sixth rib. Thoracentesis performed in the ED. Admitted to MICU.    Overnight events:   Overnight, pt had an episode of PVCs, cardio was consulted. On EKG, only 1 PVC was seen. Pt was febrile to 102 and hypotensive. S/p 500cc bolus. Pt s/p thoracentesis in ED and hgb dropped from 14.3 to 11.5, Eliquis was stopped due to concern for hemothorax.     Subjective complaints:   Pt denies chest pain, dizziness, N/V. Pt states he is feeling better.      Present Today:   - Hayden:  No [ X ], Yes [   ] : Indication:     - Type of IV Access:       .. CVC/Piccline:  No [ X ], Yes [   ] : Indication:       .. Midline: No [ X ], Yes [   ] : Indication:                                             ----------------------------------------------------------  OBJECTIVE  PAST MEDICAL & SURGICAL HISTORY  History of COPD    CAD (coronary atherosclerotic disease)    DM (diabetes mellitus)    Heart failure    History of surgical removal of testicle                                              -----------------------------------------------------------  ALLERGIES:  contrast media (iodine-based) (Other)  penicillin (Unknown)                                            ------------------------------------------------------------    HOME MEDICATIONS  Home Medications:  acyclovir 400 mg oral tablet: 1 tab(s) orally 2 times a day (2022 15:03)  Aspir 81 oral delayed release tablet: 1 tab(s) orally once a day (:44)  calcium (as calcium citrate) 250 mg oral tablet: 1 tab(s) orally 2 times a day (:44)  cholecalciferol 25 mcg (1000 intl units) oral tablet: 1 tab(s) orally once a day (2022 15:04)  docusate sodium 100 mg oral tablet: 1 tab(s) orally 2 times a day (2022 15:05)  DULoxetine 20 mg oral delayed release capsule: 1 cap(s) orally once a day (:44)  eplerenone 25 mg oral tablet: 1 tab(s) orally once a day (2022 15:06)  folic acid 1 mg oral tablet: 1 tab(s) orally once a day (:44)  Horizant 600 mg oral tablet, extended release: 1 tab(s) orally once a day (2022 15:08)  Lasix 40 mg oral tablet: 1 tab(s) orally once a day (2022 15:21)  loratadine 10 mg oral tablet: 1 tab(s) orally once a day (2022 09:44)  loteprednol 0.2% ophthalmic suspension: 1 drop(s) to each affected eye 2 times a day, As Needed (2022 15:09)  metFORMIN 500 mg oral tablet: 1 tab(s) orally 2 times a day (:44)  methadone 5 mg oral tablet: orally 2 times a day (:44)  montelukast 10 mg oral tablet: 1 tab(s) orally once a day (:44)  multivitamin:  (:44)  oxyCODONE 5 mg oral tablet: 1 tab(s) orally every 4 hours, As Needed (2022 15:02)  Tyvaso 0.6 mg/mL inhalation solution: 9 puff(s) inhaled 4 times a day (2022 09:44)                           MEDICATIONS:  STANDING MEDICATIONS  acyclovir   Oral Tab/Cap 400 milliGRAM(s) Oral two times a day  aspirin enteric coated 81 milliGRAM(s) Oral daily  cefepime   IVPB      cefepime   IVPB 2000 milliGRAM(s) IV Intermittent once  cefepime   IVPB 2000 milliGRAM(s) IV Intermittent every 12 hours  chlorhexidine 4% Liquid 1 Application(s) Topical <User Schedule>  cholecalciferol 1000 Unit(s) Oral daily  DULoxetine 20 milliGRAM(s) Oral daily  folic acid 1 milliGRAM(s) Oral daily  gabapentin 600 milliGRAM(s) Oral daily  lactated ringers. 500 milliLiter(s) IV Continuous <Continuous>  levoFLOXacin IVPB 750 milliGRAM(s) IV Intermittent every 48 hours  loratadine 10 milliGRAM(s) Oral daily  methadone    Tablet 5 milliGRAM(s) Oral two times a day  montelukast 10 milliGRAM(s) Oral daily  multivitamin Oral Tab/Cap - Peds 1 Tablet(s) Oral daily  pantoprazole    Tablet 40 milliGRAM(s) Oral before breakfast  sodium chloride 0.9%. 1000 milliLiter(s) IV Continuous <Continuous>  vancomycin  IVPB 1250 milliGRAM(s) IV Intermittent every 24 hours    PRN MEDICATIONS  acetaminophen     Tablet .. 650 milliGRAM(s) Oral every 6 hours PRN  oxyCODONE    IR 5 milliGRAM(s) Oral every 4 hours PRN                                            ------------------------------------------------------------  VITAL SIGNS: Last 24 Hours  T(C): 36.9 (2022 04:00), Max: 39.1 (2022 20:00)  T(F): 98.4 (2022 04:00), Max: 102.4 (2022 20:00)  HR: 100 (2022 09:00) (90 - 100)  BP: 105/73 (2022 09:00) (73/52 - 119/75)  BP(mean): 81 (2022 09:) (58 - 90)  RR: 22 (2022 09:00) (14 - 39)  SpO2: 94% (2022 09:00) (91% - 97%)      22 @ 07:01  -  22 @ 07:00  --------------------------------------------------------  IN: 1000 mL / OUT: 401 mL / NET: 599 mL                                             --------------------------------------------------------------  LABS:                        11.5   19.06 )-----------( 198      ( 2022 05:00 )             35.2     0412    137  |  96<L>  |  33<H>  ----------------------------<  130<H>  4.2   |  23  |  1.9<H>    Ca    9.1      2022 05:00  Mg     1.6     04-12    TPro  5.1<L>  /  Alb  3.3<L>  /  TBili  0.9  /  DBili  x   /  AST  16  /  ALT  9   /  AlkPhos  89  04-11    PT/INR - ( 2022 09:09 )   PT: 13.50 sec;   INR: 1.18 ratio         PTT - ( 2022 09:09 )  PTT:23.4 sec  Urinalysis Basic - ( 2022 09:59 )    Color: Yellow / Appearance: Clear / S.010 / pH: x  Gluc: x / Ketone: Negative  / Bili: Negative / Urobili: <2 mg/dL   Blood: x / Protein: Negative / Nitrite: Negative   Leuk Esterase: Negative / RBC: x / WBC x   Sq Epi: x / Non Sq Epi: x / Bacteria: x        Troponin T, Serum: 0.03 ng/mL *HH* (22 @ 05:00)  Troponin T, Serum: 0.04 ng/mL *HH* (22 @ 21:43)  Lactate, Blood: 2.4 mmol/L *H* (22 @ 11:20)  Troponin T, Serum: 0.06 ng/mL *HH* (22 @ 10:15)        Culture - Body Fluid with Gram Stain (collected 2022 16:40)  Source: .Body Fluid Pleural Fluid  Gram Stain (2022 03:20):    No polymorphonuclear leukocytes seen    No organisms seen    by cytocentrifuge    Culture - Blood (collected 2022 09:09)  Source: .Blood Blood-Peripheral  Gram Stain (2022 04:43):    Growth in aerobic bottle: Gram Positive Cocci in Pairs and Chains  Preliminary Report (2022 04:43):    Growth in aerobic bottle: Gram Positive Cocci in Pairs and Chains    ***Blood Panel PCR results on this specimen are available    approximately 3 hours after the Gram stain result.***    Gram stain, PCR, and/or culture results may not always    correspond due to difference in methodologies.    ************************************************************    This PCR assay was performed by multiplex PCR. This    Assay tests for 66 bacterial and resistance gene targets.    Please refer to the [facility] test directory    at [email or web address] for details.  Organism: Blood Culture PCR (2022 06:34)  Organism: Blood Culture PCR (2022 06:34)        CARDIAC MARKERS ( 2022 05:00 )  x     / 0.03 ng/mL / x     / x     / x      CARDIAC MARKERS ( 2022 21:43 )  x     / 0.04 ng/mL / x     / x     / x      CARDIAC MARKERS ( 2022 10:15 )  x     / 0.06 ng/mL / x     / x     / x                                                --------------------------------------------------------------    PHYSICAL EXAM:  General: NAD, on HFNC, speaking in full sentences   HEENT: NCAT  LUNGS: R-sided crackles, no use of accessory muscles  HEART: RRR, +S1,S2, no murmurs, rubs, or gallops   ABDOMEN: Soft, Non-distended, Non-tender to palpation  EXT: warm, well perfused  NEURO: AAOx3, No FND's noted  SKIN: No new breakdown or rashes noted                                           --------------------------------------------------------------

## 2022-04-12 NOTE — PROGRESS NOTE ADULT - ASSESSMENT
ASSESSMENT & PLAN  71 year old male with a hx of CHF (unspecified), COPD (5L [place] home oxygen), CAD, DM presenting with dyspnea. Pt reports a 1-month history of chills, fatigue, body aches, associated with a nonproductive cough and was diagnosed with pneumonia 2 weeks ago. Was discharged from [facility] on a course of azithromycin. but symptoms persisted. Then starting 2-3 days ago, started to develop dyspnea at rest.    #Sepsis POA  #G+ bacteremia  -SP azithromycin therapy for pneumonia  -Lactate 2.8 ->2.4 and Procal 8.22  -Leukocytosis of 24.96 on admission -> 19.06   -f/u cultures  - Cefepime, Levaquin and Vanc in ED  - Ceftriaxone started 4/12, dc levaquin and cefepime per ID    #Right pleural effusion SP thoracentesis   -Glu 102, Protein 3.6, , gram stain -ve  -Total nucleated cell count 3690, total RBC count 3000     #Acute hypoxemic respiratory failure  -HO COPD on 5L O2 at home   -currently on HFNCO2, wean as tolerated  -nebs PRN  -continue home inhalers    -ECHO: EF 50-55%, severely enlarged RV, RV volume and pressure overload, severe pulmonary hypertension  -f/u dimer    #ADITI  -Cr on admission 2.2 -> 1.9, improving    # DVT prophylaxis - Eliquis stopped    # GI prophylaxis - Protonix    # Diet - DASH/TLC    # Code status - FULL

## 2022-04-13 NOTE — PROGRESS NOTE ADULT - SUBJECTIVE AND OBJECTIVE BOX
Patient is a 71y old  Male who presents with a chief complaint of Sepsis (2022 09:49)        Over Night Events: Remains on HFNCO2.  Off pressors.          ROS:     All ROS are negative except HPI         PHYSICAL EXAM    ICU Vital Signs Last 24 Hrs  T(C): 36.9 (2022 04:00), Max: 36.9 (2022 04:00)  T(F): 98.4 (2022 04:00), Max: 98.4 (2022 04:00)  HR: 92 (2022 07:00) (92 - 106)  BP: 100/76 (2022 07:00) (91/64 - 113/70)  BP(mean): 83 (2022 07:00) (72 - 92)  ABP: --  ABP(mean): --  RR: 23 (2022 07:00) (19 - 36)  SpO2: 93% (2022 07:00) (87% - 96%)      CONSTITUTIONAL:  Well nourished.  In NAD    ENT:   Airway patent,   Mouth with normal mucosa.   No thrush    EYES:   Pupils equal,   Round and reactive to light.    CARDIAC:   Normal rate,   Regular rhythm.    No edema      Vascular:  Normal systolic impulse  No Carotid bruits    RESPIRATORY:   No wheezing  Bilateral BS  Normal chest expansion  Not tachypneic,  No use of accessory muscles    GASTROINTESTINAL:  Abdomen soft,   Non-tender,   No guarding,   + BS    MUSCULOSKELETAL:   Range of motion is not limited,  No clubbing, cyanosis    NEUROLOGICAL:   Alert and oriented   No motor  deficits.    SKIN:   Skin normal color for race,   Warm and dry  No evidence of rash.    PSYCHIATRIC:   Normal mood and affect.   No apparent risk to self or others.    HEMATOLOGICAL:  No cervical  lymphadenopathy.  no inguinal lymphadenopathy      22 @ 07:01  -  22 @ 07:00  --------------------------------------------------------  IN:    IV PiggyBack: 450 mL    Oral Fluid: 600 mL    sodium chloride 0.9%: 1725 mL  Total IN: 2775 mL    OUT:    Voided (mL): 1075 mL  Total OUT: 1075 mL    Total NET: 1700 mL          LABS:                            11.1   17.44 )-----------( 209      ( 2022 06:00 )             32.9                                               04-13    131<L>  |  94<L>  |  38<H>  ----------------------------<  122<H>  4.2   |  21  |  2.1<H>    Creatinine Trend  BUN 38, Cr 2.1, (22 @ 06:00)  Creatinine Trend  BUN 38, Cr 2.0, (22 @ 21:36)  Creatinine Trend  BUN 33, Cr 1.9, (22 @ 05:00)  Creatinine Trend  BUN 32, Cr 1.9, (22 @ 23:40)  Creatinine Trend  BUN 30, Cr 2.2, (22 @ 09:09)      Ca    8.5      2022 06:00  Phos  4.0     -  Mg     1.9     -    TPro  5.1<L>  /  Alb  3.3<L>  /  TBili  0.9  /  DBili  x   /  AST  16  /  ALT  9   /  AlkPhos  89  04-11      PT/INR - ( 2022 09:09 )   PT: 13.50 sec;   INR: 1.18 ratio         PTT - ( 2022 09:09 )  PTT:23.4 sec                                       Urinalysis Basic - ( 2022 09:59 )    Color: Yellow / Appearance: Clear / S.010 / pH: x  Gluc: x / Ketone: Negative  / Bili: Negative / Urobili: <2 mg/dL   Blood: x / Protein: Negative / Nitrite: Negative   Leuk Esterase: Negative / RBC: x / WBC x   Sq Epi: x / Non Sq Epi: x / Bacteria: x        CARDIAC MARKERS ( 2022 05:00 )  x     / 0.03 ng/mL / x     / x     / x      CARDIAC MARKERS ( 2022 21:43 )  x     / 0.04 ng/mL / x     / x     / x      CARDIAC MARKERS ( 2022 10:15 )  x     / 0.06 ng/mL / x     / x     / x                                                LIVER FUNCTIONS - ( 2022 23:40 )  Alb: 3.3 g/dL / Pro: 5.1 g/dL / ALK PHOS: 89 U/L / ALT: 9 U/L / AST: 16 U/L / GGT: x                                                  Culture - Body Fluid with Gram Stain (collected 2022 16:40)  Source: .Body Fluid Pleural Fluid  Gram Stain (2022 03:20):    No polymorphonuclear leukocytes seen    No organisms seen    by cytocentrifuge  Preliminary Report (2022 20:31):    No growth to date.    Culture - Urine (collected 2022 09:57)  Source: Clean Catch Clean Catch (Midstream)  Preliminary Report (2022 17:00):    10,000 - 49,000 CFU/mL Enterococcus species    <10,000 CFU/ml Normal Urogenital garrett present    Culture - Blood (collected 2022 09:09)  Source: .Blood Blood-Peripheral  Gram Stain (2022 04:43):    Growth in aerobic bottle: Gram Positive Cocci in Pairs and Chains  Preliminary Report (2022 04:43):    Growth in aerobic bottle: Gram Positive Cocci in Pairs and Chains    ***Blood Panel PCR results on this specimen are available    approximately 3 hours after the Gram stain result.***    Gram stain, PCR, and/or culture results may not always    correspond due to difference in methodologies.    ************************************************************    This PCR assay was performed by multiplex PCR. This    Assay tests for 66 bacterial and resistance gene targets.    Please refer to the [facility] test directory    at [email or web address] for details.  Organism: Blood Culture PCR (2022 06:34)  Organism: Blood Culture PCR (2022 06:34)    Culture - Blood (collected 2022 09:09)  Source: .Blood Blood-Peripheral  Preliminary Report (2022 20:01):    No growth to date.                                                                                           MEDICATIONS  (STANDING):  acyclovir   Oral Tab/Cap 400 milliGRAM(s) Oral two times a day  aspirin enteric coated 81 milliGRAM(s) Oral daily  cefTRIAXone   IVPB 2000 milliGRAM(s) IV Intermittent every 12 hours  chlorhexidine 4% Liquid 1 Application(s) Topical <User Schedule>  cholecalciferol 1000 Unit(s) Oral daily  DULoxetine 20 milliGRAM(s) Oral daily  folic acid 1 milliGRAM(s) Oral daily  gabapentin 600 milliGRAM(s) Oral daily  lactated ringers. 500 milliLiter(s) (1000 mL/Hr) IV Continuous <Continuous>  loratadine 10 milliGRAM(s) Oral daily  magnesium sulfate  IVPB 1 Gram(s) IV Intermittent once  methadone    Tablet 5 milliGRAM(s) Oral two times a day  montelukast 10 milliGRAM(s) Oral daily  multivitamin Oral Tab/Cap - Peds 1 Tablet(s) Oral daily  pantoprazole    Tablet 40 milliGRAM(s) Oral before breakfast  polyethylene glycol 3350 17 Gram(s) Oral daily  senna 2 Tablet(s) Oral at bedtime  sodium chloride 0.9%. 1000 milliLiter(s) (75 mL/Hr) IV Continuous <Continuous>  vancomycin  IVPB 1250 milliGRAM(s) IV Intermittent every 24 hours    MEDICATIONS  (PRN):  acetaminophen     Tablet .. 650 milliGRAM(s) Oral every 6 hours PRN Temp greater or equal to 38C (100.4F), Moderate Pain (4 - 6)  oxyCODONE    IR 5 milliGRAM(s) Oral every 4 hours PRN Moderate Pain (4 - 6)      New X-rays reviewed:                                                                                  ECHO    CXR interpreted by me:

## 2022-04-13 NOTE — PROGRESS NOTE ADULT - SUBJECTIVE AND OBJECTIVE BOX
CLAUDIA PINTO 71y Male  MRN#: [id number]   CODE STATUS: FULL    Hospital Day: 2d    Pt is currently admitted with the primary diagnosis of sepsis    SUBJECTIVE  Hospital Course:  71 year old male with a hx of CHF (unspecified), COPD (5L [place] home oxygen), CAD, DM presenting with dyspnea. Beginning almost one month ago he began to feel chills, fatigue, body aches, associated with a nonproductive cough. He went to [facility] 2 weeks ago due to these symptoms was diagnosed with pneumonia. Was discharged on a course of azithromycin. Symptoms persisted. Then starting 2-3 days ago, started to develop dyspnea at rest. Denies any chest pain, wheezing, abdominal pain, or n/v. Did endorse increased swelling in lower extremities Called EMS, was found to be saturating 80s on 5L. Was placed on 8L and brought to the ED.   In the ED VITALS: Temp 100.1F, , BP 97/64, RR 22, SpO2 94% on 8L. Began to desaturate further and was placed on HFNC. WBC 24.69. Troponin 0.06, BNP 05205.   CT chest: 1.  No CT evidence of acute pulmonary embolus.2.  Moderate size multiloculated right pleural effusions.3.  Conglomerate nodes versus a central mass surrounding the proximal right middle lobe bronchi.4.  Numerous other pulmonary nodules measuring up to 9 mm as described above. Mediastinal and bilateral hilar lymphadenopathy. Possible neoplastic process suspected.5.  Ill-defined sclerosis in the left posterior sixth rib. Thoracentesis performed in the ED. Admitted to MICU.    Overnight events:   Remains on HFNCO2.      Subjective complaints:   Pt denies chest pain, dizziness, N/V.                                              ----------------------------------------------------------  OBJECTIVE  PAST MEDICAL & SURGICAL HISTORY  History of COPD    CAD (coronary atherosclerotic disease)    DM (diabetes mellitus)    Heart failure    History of surgical removal of testicle                                              -----------------------------------------------------------  ALLERGIES:  contrast media (iodine-based) (Other)  penicillin (Unknown)                                            ------------------------------------------------------------    HOME MEDICATIONS  Home Medications:  acyclovir 400 mg oral tablet: 1 tab(s) orally 2 times a day (2022 15:03)  Aspir 81 oral delayed release tablet: 1 tab(s) orally once a day (:44)  calcium (as calcium citrate) 250 mg oral tablet: 1 tab(s) orally 2 times a day (:44)  cholecalciferol 25 mcg (1000 intl units) oral tablet: 1 tab(s) orally once a day (2022 15:04)  docusate sodium 100 mg oral tablet: 1 tab(s) orally 2 times a day (2022 15:05)  DULoxetine 20 mg oral delayed release capsule: 1 cap(s) orally once a day (:44)  eplerenone 25 mg oral tablet: 1 tab(s) orally once a day (2022 15:06)  folic acid 1 mg oral tablet: 1 tab(s) orally once a day (:44)  Horizant 600 mg oral tablet, extended release: 1 tab(s) orally once a day (2022 15:08)  Lasix 40 mg oral tablet: 1 tab(s) orally once a day (2022 15:21)  loratadine 10 mg oral tablet: 1 tab(s) orally once a day (:44)  loteprednol 0.2% ophthalmic suspension: 1 drop(s) to each affected eye 2 times a day, As Needed (2022 15:09)  metFORMIN 500 mg oral tablet: 1 tab(s) orally 2 times a day (:44)  methadone 5 mg oral tablet: orally 2 times a day (:44)  montelukast 10 mg oral tablet: 1 tab(s) orally once a day (:44)  multivitamin:  (2022 09:44)  oxyCODONE 5 mg oral tablet: 1 tab(s) orally every 4 hours, As Needed (2022 15:02)  Tyvaso 0.6 mg/mL inhalation solution: 9 puff(s) inhaled 4 times a day (2022 09:44)                           MEDICATIONS:  STANDING MEDICATIONS  acyclovir   Oral Tab/Cap 400 milliGRAM(s) Oral two times a day  aspirin enteric coated 81 milliGRAM(s) Oral daily  cefTRIAXone   IVPB 2000 milliGRAM(s) IV Intermittent every 12 hours  chlorhexidine 4% Liquid 1 Application(s) Topical <User Schedule>  cholecalciferol 1000 Unit(s) Oral daily  DULoxetine 20 milliGRAM(s) Oral daily  folic acid 1 milliGRAM(s) Oral daily  gabapentin 600 milliGRAM(s) Oral daily  loratadine 10 milliGRAM(s) Oral daily  methadone    Tablet 5 milliGRAM(s) Oral two times a day  montelukast 10 milliGRAM(s) Oral daily  multivitamin Oral Tab/Cap - Peds 1 Tablet(s) Oral daily  pantoprazole    Tablet 40 milliGRAM(s) Oral before breakfast  polyethylene glycol 3350 17 Gram(s) Oral daily  senna 2 Tablet(s) Oral at bedtime    PRN MEDICATIONS  acetaminophen     Tablet .. 650 milliGRAM(s) Oral every 6 hours PRN  oxyCODONE    IR 5 milliGRAM(s) Oral every 4 hours PRN                                            ------------------------------------------------------------  VITAL SIGNS: Last 24 Hours  T(C): 36.4 (2022 08:00), Max: 36.9 (2022 04:00)  T(F): 97.6 (2022 08:00), Max: 98.4 (2022 04:00)  HR: 98 (2022 10:00) (92 - 106)  BP: 115/68 (2022 10:00) (91/68 - 115/68)  BP(mean): 85 (2022 10:00) (72 - 92)  RR: 22 (2022 10:00) (19 - 36)  SpO2: 94% (2022 10:00) (87% - 96%)      22 @ 07:01  -  22 @ 07:00  --------------------------------------------------------  IN: 2775 mL / OUT: 1075 mL / NET: 1700 mL    22 @ 07:01  -  22 @ 10:34  --------------------------------------------------------  IN: 75 mL / OUT: 250 mL / NET: -175 mL                                             --------------------------------------------------------------  LABS:                        11.1   17.44 )-----------( 209      ( 2022 06:00 )             32.9     04-13    131<L>  |  94<L>  |  38<H>  ----------------------------<  122<H>  4.2   |  21  |  2.1<H>    Ca    8.5      2022 06:00  Phos  4.0     04-13  Mg     1.9     04-13    TPro  5.1<L>  /  Alb  3.3<L>  /  TBili  0.9  /  DBili  x   /  AST  16  /  ALT  9   /  AlkPhos  89  04-11      Urinalysis Basic - ( 2022 08:10 )    Color: Yellow / Appearance: Clear / S.030 / pH: x  Gluc: x / Ketone: Negative  / Bili: Negative / Urobili: <2 mg/dL   Blood: x / Protein: Negative / Nitrite: Negative   Leuk Esterase: Negative / RBC: 3 /HPF / WBC 1 /HPF   Sq Epi: x / Non Sq Epi: 3 /HPF / Bacteria: Few      Culture - Body Fluid with Gram Stain (collected 2022 16:40)  Source: .Body Fluid Pleural Fluid  Gram Stain (2022 03:20):    No polymorphonuclear leukocytes seen    No organisms seen    by cytocentrifuge  Preliminary Report (2022 20:31):    No growth to date.    Culture - Urine (collected 2022 09:57)  Source: Clean Catch Clean Catch (Midstream)  Preliminary Report (2022 17:00):    10,000 - 49,000 CFU/mL Enterococcus species    <10,000 CFU/ml Normal Urogenital garrett present    Culture - Blood (collected 2022 09:09)  Source: .Blood Blood-Peripheral  Gram Stain (2022 04:43):    Growth in aerobic bottle: Gram Positive Cocci in Pairs and Chains  Preliminary Report (2022 04:43):    Growth in aerobic bottle: Gram Positive Cocci in Pairs and Chains    ***Blood Panel PCR results on this specimen are available    approximately 3 hours after the Gram stain result.***    Gram stain, PCR, and/or culture results may not always    correspond due to difference in methodologies.    ************************************************************    This PCR assay was performed by multiplex PCR. This    Assay tests for 66 bacterial and resistance gene targets.    Please refer to the [facility] test directory    at [email or web address] for details.  Organism: Blood Culture PCR (2022 06:34)  Organism: Blood Culture PCR (2022 06:34)    Culture - Blood (collected 2022 09:09)  Source: .Blood Blood-Peripheral  Preliminary Report (2022 20:01):    No growth to date.        CARDIAC MARKERS ( 2022 05:00 )  x     / 0.03 ng/mL / x     / x     / x      CARDIAC MARKERS ( 2022 21:43 )  x     / 0.04 ng/mL / x     / x     / x                                                    --------------------------------------------------------------    PHYSICAL EXAM:  General: NAD, on HFNCO2, speaking in full sentences   HEENT: NCAT  LUNGS: CTAB, no use of accessory muscles  HEART: RRR, +S1,S2, no murmurs, rubs, or gallops   ABDOMEN: Soft, non-distended, non-tender to palpation  EXT: warm, well perfused  NEURO: AAOx3, No FND's noted  SKIN: No new breakdown or rashes noted                                             --------------------------------------------------------------    ASSESSMENT & PLAN  71 year old male with a hx of CHF (unspecified), COPD (5L [place] home oxygen), CAD, DM presenting with dyspnea. Pt reports a 1-month history of chills, fatigue, body aches, associated with a nonproductive cough and was diagnosed with pneumonia 2 weeks ago. Was discharged from [facility] on a course of azithromycin. but symptoms persisted. Then starting 2-3 days ago, started to develop dyspnea at rest.    #Sepsis POA  #G+ bacteremia  -SP azithromycin therapy for pneumonia  -Lactate 2.8 ->2.4 and Procal 8.22  -Leukocytosis of 24.96 on admission -> 19.06   -f/u cultures  -d/c'd levaquin and cefepime per ID, Ceftriaxone started   -c/w ceftriaxone (started )  -d/c vancomycin    #Right pleural effusion SP thoracentesis   -Glu 102, Protein 3.6, , gram stain -ve  -Total nucleated cell count 3690, total RBC count 3000     #Acute hypoxemic respiratory failure  -HO COPD on 5L O2 at home   -currently on HFNCO2, wean as tolerated  -nebs PRN  -continue home inhalers    -ECHO: EF 50-55%, severely enlarged RV, RV volume and pressure overload, severe pulmonary hypertension  -D-dimer 1374, duplex negative  -encourage incentive spirometry   -d/c IVF    #ADITI  -Cr on admission 2.2 (unknown baseline) -> 1.9, stable     #DVT prophylaxis - None    #GI prophylaxis - Protonix    #Diet - DASH/TLC    #Code status - FULL                                                                                                             ----------------------------------------------------  # DVT prophylaxis     # GI prophylaxis     # Diet     # Activity Score (AM-PAC)    # Code status     # Disposition                                                                              --------------------------------------------------------    # Handoff

## 2022-04-13 NOTE — PROGRESS NOTE ADULT - ASSESSMENT
ASSESSMENT  71 year old male with a hx of BMT for leukemia, CHF (unspecified), COPD (5L [place] home oxygen), CAD, DM presenting with dyspnea. Beginning almost one month ago he began to feel chills, fatigue, body aches, associated with a nonproductive cough. He went to [facility] 2 weeks ago due to these symptoms was diagnosed with pneumonia. Was discharged on a course of azithromycin. Symptoms persisted. Then starting 2-3 days ago, started to develop dyspnea at rest    IMPRESSION  #Streptococcus pneumoniae bacteremia with PNA and Severe Sepsis on admission T>101 P>90 WBC 24 ADITI lactic acidosis     4/12 BCX NGTD     4/11 BCX strep pneumo    Procalcitonin, Serum: 8.22 ng/mL (04-11-22 @ 16:00)    4/11 pleural   No polymorphonuclear leukocytes seen    No organisms seen    CT CHest 1.  No CT evidence of acute pulmonary embolus.  2.  Moderate size multiloculated right pleural effusions.  3.  Conglomerate nodes versus a central mass surrounding the proximal right middle lobe bronchi.  4.  Numerous other pulmonary nodules measuring up to 9 mm as described above. Mediastinal and bilateral hilar lymphadenopathy. Possible   neoplastic process suspected.  5.  Ill-defined sclerosis in the left posterior sixth rib.  #Asymptomatic bacteriuria    4/11 UCX   10,000 - 49,000 CFU/mL Enterococcus species  #Lactic acidosis  #Hyponatremia   #Hx BMT   #Morbid Obesity BMI (kg/m2): 32.6  #COPD home O2  #DM   #Abx allergy: penicillin (Unknown)    Creatinine, Serum: 1.9 (04-12-22 @ 05:00)  CrCL 45  Weight (kg): 91.5 (04-11-22 @ 17:00)    RECOMMENDATIONS  - ADD on AM VANC level - goal 15-20 continue until strep sensitivities result  - f/u Strep sensitivities MICs  - TTE no vegetations   - on ACV ppx     If any questions, please call or send a message on [employer] Teams  Please continue to update ID with any pertinent new laboratory or radiographic findings  Spectra [id number]

## 2022-04-13 NOTE — PROGRESS NOTE ADULT - SUBJECTIVE AND OBJECTIVE BOX
CLAUDIA PINTO  71y, Male  Allergy: contrast media (iodine-based) (Other)  penicillin (Unknown)      LOS  2d    CHIEF COMPLAINT: Sepsis (2022 10:33)      INTERVAL EVENTS/HPI  - No acute events overnight  - T(F): , Max: 98.4 (22 @ 04:00)  - Denies any worsening symptoms  - Tolerating medication  - WBC Count: 17.44 (22 @ 06:00)  WBC Count: 19.06 (22 @ 05:00)     - Creatinine, Serum: 2.1 (22 @ 06:00)  Creatinine, Serum: 2.0 (22 @ 21:36)       ROS  General: Denies rigors, nightsweats  HEENT: Denies headache, rhinorrhea, sore throat, eye pain  CV: Denies CP, palpitations  PULM: Denies wheezing, hemoptysis  GI: Denies hematemesis, hematochezia, melena  : Denies discharge, hematuria  MSK: Denies arthralgias, myalgias  SKIN: Denies rash, lesions  NEURO: Denies paresthesias, weakness  PSYCH: Denies depression, anxiety    VITALS:  T(F): 97.6, Max: 98.4 (22 @ 04:00)  HR: 98  BP: 115/68  RR: 22Vital Signs Last 24 Hrs  T(C): 36.4 (2022 08:00), Max: 36.9 (2022 04:00)  T(F): 97.6 (2022 08:00), Max: 98.4 (2022 04:00)  HR: 98 (2022 10:00) (92 - 106)  BP: 115/68 (2022 10:00) (91/68 - 115/68)  BP(mean): 85 (2022 10:00) (72 - 89)  RR: 22 (2022 10:00) (20 - 36)  SpO2: 94% (2022 10:00) (87% - 96%)    PHYSICAL EXAM:  Gen: NAD, resting in bed HFNC  HEENT: Normocephalic, atraumatic  Neck: supple, no lymphadenopathy  CV: Regular rate & regular rhythm  Lungs: decreased BS at bases, no fremitus  Abdomen: Soft, BS present  Ext: Warm, well perfused  Neuro: non focal, awake  Skin: no rash, no erythema  Lines: no phlebitis    FH: Non-contributory  Social Hx: Non-contributory    TESTS & MEASUREMENTS:                        11.1   17.44 )-----------( 209      ( 2022 06:00 )             32.9     04-13    131<L>  |  94<L>  |  38<H>  ----------------------------<  122<H>  4.2   |  21  |  2.1<H>    Ca    8.5      2022 06:00  Phos  4.0     -  Mg     1.9     -    TPro  5.1<L>  /  Alb  3.3<L>  /  TBili  0.9  /  DBili  x   /  AST  16  /  ALT  9   /  AlkPhos  89  -11      LIVER FUNCTIONS - ( 2022 23:40 )  Alb: 3.3 g/dL / Pro: 5.1 g/dL / ALK PHOS: 89 U/L / ALT: 9 U/L / AST: 16 U/L / GGT: x           Urinalysis Basic - ( 2022 08:10 )    Color: Yellow / Appearance: Clear / S.030 / pH: x  Gluc: x / Ketone: Negative  / Bili: Negative / Urobili: <2 mg/dL   Blood: x / Protein: Negative / Nitrite: Negative   Leuk Esterase: Negative / RBC: 3 /HPF / WBC 1 /HPF   Sq Epi: x / Non Sq Epi: 3 /HPF / Bacteria: Few        Culture - Blood (collected 22 @ 05:00)  Source: .Blood Blood  Preliminary Report (22 @ 13:01):    No growth to date.    Culture - Body Fluid with Gram Stain (collected 22 @ 16:40)  Source: .Body Fluid Pleural Fluid  Gram Stain (22 @ 03:20):    No polymorphonuclear leukocytes seen    No organisms seen    by cytocentrifuge  Preliminary Report (22 @ 20:31):    No growth to date.    Culture - Urine (collected 22 @ 09:57)  Source: Clean Catch Clean Catch (Midstream)  Preliminary Report (22 @ 17:00):    10,000 - 49,000 CFU/mL Enterococcus species    <10,000 CFU/ml Normal Urogenital garrett present    Culture - Blood (collected 22 @ 09:09)  Source: .Blood Blood-Peripheral  Gram Stain (22 @ 04:43):    Growth in aerobic bottle: Gram Positive Cocci in Pairs and Chains  Preliminary Report (22 11:22):    Growth in aerobic bottle: Streptococcus pneumoniae    Susceptibility to follow.    ***Blood Panel PCR results on this specimen are available    approximately 3 hours after the Gram stain result.***    Gram stain, PCR, and/or culture results may not always    correspond due to difference in methodologies.    ************************************************************    This PCR assay was performed by multiplex PCR. This    Assay tests for 66 bacterial and resistance gene targets.    Please refer to the [facility] test directory    at [email or web address] for details.  Organism: Blood Culture PCR (22 @ 06:34)  Organism: Blood Culture PCR (22 @ 06:34)      -  Streptococcus pneumoniae: Detec      Method Type: PCR    Culture - Blood (collected 22 @ 09:09)  Source: .Blood Blood-Peripheral  Preliminary Report (22 @ 20:01):    No growth to date.        Lactate, Blood: 2.4 mmol/L (22 @ 11:20)  Blood Gas Venous - Lactate: 2.80 mmol/L (22 @ 08:51)      INFECTIOUS DISEASES TESTING  Hepatitis C Virus Interpretation Result: Nonreact (22 @ 05:00)  Procalcitonin, Serum: 8.94 (22 @ 05:00)  Procalcitonin, Serum: 8.22 (22 @ 16:00)  COVID-19 PCR: NotDetec (22 @ 09:09)      INFLAMMATORY MARKERS      RADIOLOGY & ADDITIONAL TESTS:  I have personally reviewed the last available Chest xray  CXR  Xray Chest 1 View- PORTABLE-Urgent:   ACC: [id number] EXAM:  XR CHEST PORTABLE URGENT 1V                          PROCEDURE DATE:  2022          INTERPRETATION:  STUDY INDICATION: pna    TECHNIQUE:  Portable frontal view of the chest obtained.    COMPARISON: 2022    FINDINGS/  IMPRESSION:    Unchanged right pleural effusion and opacities. Milder left basal   opacities stable to slightly increased. No pneumothorax.    Stable cardiomediastinal silhouette.    Unchanged osseous structures.    --- End of Report ---            CHRISTIANO DOUGLAS MD; Attending Radiologist  This document has been electronically signed. 2022 10:44AM (22 @ 09:08)      CT  CT Angio Chest PE Protocol w/ IV Cont:   ACC: [id number] EXAM:  CT ANGIO CHEST PULM ART [facility]                          PROCEDURE DATE:  2022          INTERPRETATION:  CLINICAL HISTORY / REASON FOR EXAM: High pretest   probability for pulmonary embolism. Shortness of breath.    TECHNIQUE: Multislice helical sections were obtained from the thoracic   inlet to the lung bases during rapid administration of 60cc Optiray 320   intravenous contrast using a CTA protocol. Thin sections were   reconstructed through the pulmonary vasculature. Sagittal and coronal   reformatted images were acquired, as well as MIP reconstructed images.    COMPARISON: None available.    FINDINGS:  Pulmonary embolus: No CT evidence of acute pulmonary embolus.    Lungs/Airways/Pleura: Moderate size multiloculated right pleural   effusions. No pneumothorax.    Conglomerate nodes versus central mass surrounding the right middle lobe   bronchi (series 11 image 158).    Superior segment right lower lobe 6 mm nodule, possibly intrafissural   (series 11 image 102). Left upper lobe lingula 9 mm nodule abutting the   pericardium (series 11 image 144). Left lower lobe 8 mm nodule (series 11   image 215). Right upper lobe 5 mm nodule abutting the mediastinum (series   11 image 63).    Heart/Vascular: Reflux ofcontrast into the IVC may indicate a component   of right heart dysfunction. Right ventricular prominence.    Mediastinum/Lymph nodes: Mediastinal and bilateral hilar lymph nodes. For   example:  -Right upper paratracheal 1.2 cm  -Para-aortic 0.9 cm  -Right paraesophageal 1.2 cm  -Right hilar nodes measuring up to 1.3 cm  -Left hilar nodes measuring up to 1.2 cm    Visualized upper abdomen: No focal abnormality.    Bones/soft tissues: Degenerative and scoliotic changes throughout the   vertebral column. Ill-defined sclerosis left posterior sixth rib (series   11 image 114).    IMPRESSION:  1.  No CT evidence of acute pulmonary embolus.  2.  Moderate size multiloculated right pleural effusions.  3.  Conglomerate nodes versus a central mass surrounding the proximal   right middle lobe bronchi.  4.  Numerous other pulmonary nodules measuring up to 9 mm as described   above. Mediastinal and bilateral hilar lymphadenopathy. Possible   neoplastic process suspected.  5.  Ill-defined sclerosis inthe left posterior sixth rib.    --- End of Report ---            FRANKY DE GUZMAN MD; Attending Radiologist  This document has been electronically signed. 2022 11:45AM (22 @ 11:04)      CARDIOLOGY TESTING  12 Lead ECG:   Ventricular Rate 92 BPM    Atrial Rate 92 BPM    P-R Interval 164 ms    QRS Duration 92 ms    Q-T Interval 396 ms    QTC Calculation(Bazett) 489 ms    P Axis 45 degrees    R Axis -62 degrees    T Axis 148 degrees    Diagnosis Line Normal sinus rhythm  Left axis deviation  ST & T wave abnormality, consider anterolateral ischemia  Prolonged QT  Abnormal ECG    Confirmed by Sweetie Garcia MD ([id number]) on 2022 8:07:10 AM (22 @ 13:54)      MEDICATIONS  acyclovir   Oral Tab/Cap 400 Oral two times a day  aspirin enteric coated 81 Oral daily  cefTRIAXone   IVPB 2000 IV Intermittent every 12 hours  chlorhexidine 4% Liquid 1 Topical <User Schedule>  cholecalciferol 1000 Oral daily  DULoxetine 20 Oral daily  folic acid 1 Oral daily  gabapentin 600 Oral daily  loratadine 10 Oral daily  methadone    Tablet 5 Oral two times a day  montelukast 10 Oral daily  multivitamin Oral Tab/Cap - Peds 1 Oral daily  pantoprazole    Tablet 40 Oral before breakfast  polyethylene glycol 3350 17 Oral daily  senna 2 Oral at bedtime      WEIGHT  Weight (kg): 91.5 (22 @ 17:00)  Creatinine, Serum: 2.1 mg/dL (22 @ 06:00)  Creatinine, Serum: 2.0 mg/dL (22 @ 21:36)      ANTIBIOTICS:  acyclovir   Oral Tab/Cap 400 milliGRAM(s) Oral two times a day  cefTRIAXone   IVPB 2000 milliGRAM(s) IV Intermittent every 12 hours      All available historical records have been reviewed

## 2022-04-13 NOTE — PROGRESS NOTE ADULT - ASSESSMENT
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  Aspiration precautions.  Wean O2 as tolerated.  FU PFA.  Nebs PRN.  Continue home inhalers.  Incentive spirometry     CARDIOVASCULAR:  ECHO noted DC IVF.     GI: GI prophylaxis.  Feeding.  Bowel regimen     RENAL:  Follow up lytes.  Correct as needed.  Bladder scans     INFECTIOUS DISEASE: Follow up cultures.  Continue Rocephin       HEMATOLOGICAL:  DVT prophylaxis.  Dimer noted.  FU LE duplex     ENDOCRINE:  Follow up FS.  Insulin protocol if needed.    MUSCULOSKELETAL:  OOB to chair     MICU

## 2022-04-14 NOTE — CHART NOTE - NSCHARTNOTEFT_GEN_A_CORE
Called by RN that patient was agonal breathing and unresponsive. It was noted patient was pulseless and code blue was called with CPR immediately started. ROSC obtained after 1 round of CPR, epi x1 administered. Family informed and states that patient is actually a DNR/DNI (they have a Japanese version of a MOLST at home). Family decided to make patient CMO and palliatively extubate patient. Morphine, Ativan, Dilaudid all ordered for comfort. Called by RN that patient was agonal breathing and unresponsive. It was noted patient was pulseless and code blue was called with CPR immediately started. ROSC obtained after 1 round of CPR, epi x1 administered, and patient extubated. Family informed and states that patient is actually a DNR/DNI (they have a Bhutanese version of a MOLST at home). Family decided to make patient CMO and palliatively extubate patient. Morphine, Ativan, Dilaudid all ordered for comfort. Called by RN that patient was agonal breathing and unresponsive. It was noted patient was pulseless with PEA on monitor and code blue was called with CPR immediately started. ROSC obtained after 1 round of CPR, epi x1 administered, and patient extubated. Family informed and states that patient is actually a DNR/DNI (they have a [place]ran version of a MOLST at home). Family decided to make patient CMO and palliatively extubate patient. Morphine, Ativan, Dilaudid all ordered for comfort. Called by RN that patient was agonal breathing and unresponsive. It was noted patient was pulseless with PEA on monitor and code blue was called with CPR immediately started. ROSC obtained after 1 round of CPR, epi x1 administered, and patient intubated. Family was informed and who states that patient is actually a DNR/DNI (they have a Wallisian version of a MOLST at home which was not on file or documented here). Family decided to make patient CMO and palliatively extubate patient. Morphine, Ativan, Dilaudid all ordered for comfort. Called by RN that patient was agonal breathing and unresponsive. It was noted patient was pulseless with PEA on monitor and code blue was called with CPR immediately started. ROSC obtained after 1 round of CPR, epi x1 administered, and patient intubated. Family was informed and stated that patient is actually a DNR/DNI (they have a [place]ian version of a MOLST at home which was not on file or documented here). Family decided to make patient CMO and palliatively extubate patient. Morphine, Ativan, Dilaudid all ordered for comfort.

## 2022-04-14 NOTE — PROCEDURAL SAFETY CHECKLIST WITH OR WITHOUT SEDATION - NSPREPROCDATE6_GEN_ALL_CORE
Requested Prescriptions     Pending Prescriptions Disp Refills    omeprazole (PRILOSEC) 20 MG delayed release capsule [Pharmacy Med Name: OMEPRAZOLE 20MG CPDR] 180 capsule 4     Sig: TAKE ONE CAPSULE BY MOUTH TWICE A DAY BEFORE MEALS       Last Office Visit: 7/8/2020  Next Office Visit: 1/12/2021  Last Medication Refill: 5/28/2020
11-Apr-2022 15:22
14-Apr-2022 17:52

## 2022-04-14 NOTE — DISCHARGE NOTE FOR THE EXPIRED PATIENT - HOSPITAL COURSE
72 y/o M with PMHx of HF, COPD, CAD, DM presented for dyspnea. Admitted for sepsis 2/2 R loculated pleural effusion. Underwent thoracentesis on admission and treated with broad spectrum antibiotics. Patient initially improved but then became hypotensive overnight on 4/13. Started on pressor support. AM of 4/14 patient noted to be in hepatic failure 2/2 shock liver.    Patient became increasingly lethargic and unresponsive throughout the day. Called by RN that patient was agonal breathing and unresponsive. It was noted patient was pulseless with PEA on monitor and code blue was called with CPR immediately started. ROSC obtained after 1 round of CPR, epi x1 administered, and patient intubated. Family was informed and stated that patient is actually a DNR/DNI (they have a [place] version of a MOLST at home which was not on file or documented here). Family decided to make patient CMO. Patient was palliatively extubated and treated with Morphine/Ativan/Dilaudid for comfort. Shortly after patient became asystole on monitor. Assessed patient at bedside, noted to be pulseless with asystole and absent heart sounds. Pronounced dead on 4/14/22 @ 6:29 PM.

## 2022-04-14 NOTE — PROGRESS NOTE ADULT - ASSESSMENT
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  Aspiration precautions.  Wean O2 as tolerated.  Nebs PRN.  Continue home inhalers.  Incentive spirometry     CARDIOVASCULAR:  Avoid overload.  Wean levophed.  GDE done fluid replete     GI: GI prophylaxis.  Feeding.  Bowel regimen     RENAL:  Follow up lytes.  Correct as needed.  Bladder scans     INFECTIOUS DISEASE: Follow up cultures.  Continue Rocephin.  FU with ID     HEMATOLOGICAL:  DVT prophylaxis.  Dimer noted.  LE duplex negative      ENDOCRINE:  Follow up FS.  Insulin protocol if needed.    MUSCULOSKELETAL:  OOB to chair     MICU          IMPRESSION:    Sepsis POA  SP therapy for pneumonia  Strep pneumo bacteremia   Right pleural effusion SP thoracentesis   Acute hypoxemic respiratory failure  HO COPD on home O2   ADITI  PHTN WHO III  Elevated LFT     PLAN:    CNS:  No depressants     HEENT: Oral care    PULMONARY:  HOB @ 45 degrees.  Aspiration precautions.  Wean O2 as tolerated.  Nebs PRN.  Continue home inhalers.  Incentive spirometry     CARDIOVASCULAR:  Avoid overload.  Wean levophed.  GDE done fluid replete     GI: GI prophylaxis.  Feeding.  Bowel regimen.  Repeat LFT.  RUQ sono.  Hepatology evaluation.  Hold Acyclovir and Gabapentin.  trend LFT     RENAL:  Follow up lytes.  Correct as needed.  Bladder scans     INFECTIOUS DISEASE: Follow up cultures.  Continue Rocephin.  FU with ID     HEMATOLOGICAL:  DVT prophylaxis.  Dimer noted.  LE duplex negative      ENDOCRINE:  Follow up FS.  Insulin protocol if needed.    MUSCULOSKELETAL:  OOB to chair     MICU

## 2022-04-14 NOTE — PATIENT PROFILE ADULT - FALL HARM RISK - HARM RISK INTERVENTIONS

## 2022-04-14 NOTE — CONSULT NOTE ADULT - ASSESSMENT
71 year old male with a hx of CHF (unspecified), COPD (5L [place] home oxygen), CAD, DM presenting with dyspnea. Beginning almost one month ago he began to feel chills, fatigue, body aches, associated with a nonproductive cough. GI was consulted for elevated liver enzymes.    #)Elevated liver enzymes mixed predominantly hepatocellular likely due to shock liver   -LFT normal 4/11 0.9, 89, 16, 9, LFT today 4/14 0.7, 128, 2479, 1450  -Currently on high flow nasal cannula  -At the time of admisison received vancomycin, cefepime, acyclovir, levaquin, meropenem currently on vancomycin, rocephin     Recs:   Trend LFT, INR  Avoid hepatotoxic agents  Check acute hepatitis panel  71 year old male with a hx of CHF (unspecified), COPD (5L [place] home oxygen), CAD, DM presenting with dyspnea. Beginning almost one month ago he began to feel chills, fatigue, body aches, associated with a nonproductive cough. GI was consulted for elevated liver enzymes.    #)Elevated liver enzymes mixed predominantly hepatocellular likely due to shock liver   -LFT normal 4/11 0.9, 89, 16, 9, LFT today 4/14 0.7, 128, 2479, 1450  -Currently on high flow nasal cannula  -At the time of admisison received vancomycin, cefepime, acyclovir, levaquin, meropenem currently on vancomycin, rocephin     Recs:   Trend LFT, INR  Avoid hepatotoxic agents  Check acute hepatitis panel and US doppler to evaluate portal and hepatic vasculature

## 2022-04-14 NOTE — PROGRESS NOTE ADULT - SUBJECTIVE AND OBJECTIVE BOX
Patient is a 71y old  Male who presents with a chief complaint of Sepsis (2022 07:21)        Over Night Events:  On Levophed.  On HFNCO2 50 liters 40%.          ROS:     All ROS are negative except HPI         PHYSICAL EXAM    ICU Vital Signs Last 24 Hrs  T(C): 37.1 (2022 04:00), Max: 38.1 (2022 20:00)  T(F): 98.8 (2022 04:00), Max: 100.5 (2022 20:00)  HR: 92 (2022 07:00) (88 - 100)  BP: 106/68 (2022 07:00) (59/51 - 122/72)  BP(mean): 79 (:00) (54 - 89)  ABP: --  ABP(mean): --  RR: 23 (2022 07:00) (16 - 41)  SpO2: 95% (:00) (86% - 99%)      CONSTITUTIONAL:  Well nourished. In  NAD    ENT:   Airway patent,   Mouth with normal mucosa.   No thrush    EYES:   Pupils equal,   Round and reactive to light.    CARDIAC:   Normal rate,   Regular rhythm.    No edema      Vascular:  Normal systolic impulse  No Carotid bruits    RESPIRATORY:   No wheezing  Bilateral crackles   Normal chest expansion  Not tachypneic,  No use of accessory muscles    GASTROINTESTINAL:  Abdomen soft,   Non-tender,   No guarding,   + BS    MUSCULOSKELETAL:   Range of motion is not limited,  No clubbing, cyanosis    NEUROLOGICAL:   Alert and oriented   No motor  deficits.    SKIN:   Skin normal color for race,   Warm and dry  No evidence of rash.    PSYCHIATRIC:   Normal mood and affect.   No apparent risk to self or others.        22 @ 07:01  -  22 @ 07:00  --------------------------------------------------------  IN:    IV PiggyBack: 300 mL    Norepinephrine: 15.3 mL    Oral Fluid: 480 mL    sodium chloride 0.9%: 75 mL    sodium chloride 0.9%: 1000 mL  Total IN: 1870.3 mL    OUT:    Voided (mL): 900 mL  Total OUT: 900 mL    Total NET: 970.3 mL          LABS:                            11.3   18.14 )-----------( 204      ( 2022 05:30 )             33.1                                                   126<L>  |  91<L>  |  50<H>  ----------------------------<  128<H>  4.6   |  14<L>  |  2.4<H>    Creatinine Trend  BUN 50, Cr 2.4, (22 @ 05:30)  Creatinine Trend  BUN 38, Cr 2.1, (22 @ 06:00)  Creatinine Trend  BUN 38, Cr 2.0, (22 @ 21:36)  Creatinine Trend  BUN 33, Cr 1.9, (22 @ 05:00)  Creatinine Trend  BUN 32, Cr 1.9, (22 @ 23:40)  Creatinine Trend  BUN 30, Cr 2.2, (22 @ 09:09)      Ca    8.2<L>      2022 05:30  Phos  4.0       Mg     2.4         TPro  5.0<L>  /  Alb  2.9<L>  /  TBili  0.7  /  DBili  x   /  AST  2479<H>  /  ALT  1450<H>  /  AlkPhos  128<H>                                               Urinalysis Basic - ( 2022 08:10 )    Color: Yellow / Appearance: Clear / S.030 / pH: x  Gluc: x / Ketone: Negative  / Bili: Negative / Urobili: <2 mg/dL   Blood: x / Protein: Negative / Nitrite: Negative   Leuk Esterase: Negative / RBC: 3 /HPF / WBC 1 /HPF   Sq Epi: x / Non Sq Epi: 3 /HPF / Bacteria: Few                                                  LIVER FUNCTIONS - ( 2022 05:30 )  Alb: 2.9 g/dL / Pro: 5.0 g/dL / ALK PHOS: 128 U/L / ALT: 1450 U/L / AST: 2479 U/L / GGT: x                                                  Culture - Blood (collected 2022 05:00)  Source: .Blood Blood  Preliminary Report (2022 13:01):    No growth to date.    Culture - Body Fluid with Gram Stain (collected 2022 16:40)  Source: .Body Fluid Pleural Fluid  Gram Stain (2022 03:20):    No polymorphonuclear leukocytes seen    No organisms seen    by cytocentrifuge  Preliminary Report (2022 20:31):    No growth to date.    Culture - Urine (collected 2022 09:57)  Source: Clean Catch Clean Catch (Midstream)  Final Report (2022 22:36):    10,000 - 49,000 CFU/mL Enterococcus faecalis    <10,000 CFU/ml Normal Urogenital garrett present  Organism: Enterococcus faecalis (2022 22:36)  Organism: Enterococcus faecalis (2022 22:36)    Culture - Blood (collected 2022 09:09)  Source: .Blood Blood-Peripheral  Gram Stain (2022 04:43):    Growth in aerobic bottle: Gram Positive Cocci in Pairs and Chains  Final Report (2022 08:19):    Growth in aerobic bottle: Streptococcus pneumoniae    Streptococcus pneumoniae    Therapy requires maximum dose of Ceftriaxone and/or    Penicillin. Interpretive criteria as follows:    Ceftriaxone breakpoints for meningitis infections:    <=0.5=Sensitive, 1.0=Intermediate, >=2.0=Resistant    Penicillin breakpoints for meningitis infections:    <=0.06=Sensitive, >= 0.12=Resistant    Ceftriaxone breakpoints for non-meningitis infections:    <=1.0=Sensitive, 2.0=Intermediate, >=4.0=Resistant    Penicillin breakpoints for non-meningitis infections:    <=2.0=Sensitive, 4.0=Intermediate, >=8.0=Resistant    Oral Penicillin breakpoints:    <=0.06=Sensitive, 0.12-1.0=Intermediate, >=2.0=Resistant    Please note: In case of suspected meningitis, CSF    interpretive criteria mustbe used independent of specimen source.    ***Blood Panel PCR results on this specimen are available    approximately 3 hours after the Gram stain result.***    Gram stain, PCR, and/or culture results may not always    correspond due to difference in methodologies.    ************************************************************    This PCR assay was performed by multiplex PCR. This    Assay tests for 66 bacterial and resistance gene targets.    Please refer to the [facility] test directory    at [email or web address] for details.  Organism: Blood Culture PCR  Streptococcus pneumoniae  Streptococcus pneumoniae (2022 08:19)  Organism: Streptococcus pneumoniae (2022 08:19)  Organism: Streptococcus pneumoniae (2022 08:19)  Organism: Blood Culture PCR (2022 08:19)    Culture - Blood (collected 2022 09:09)  Source: .Blood Blood-Peripheral  Preliminary Report (2022 20:01):    No growth to date.                                                                                           MEDICATIONS  (STANDING):  acyclovir   Oral Tab/Cap 400 milliGRAM(s) Oral two times a day  aspirin enteric coated 81 milliGRAM(s) Oral daily  cefTRIAXone   IVPB 2000 milliGRAM(s) IV Intermittent every 12 hours  chlorhexidine 4% Liquid 1 Application(s) Topical <User Schedule>  cholecalciferol 1000 Unit(s) Oral daily  DULoxetine 20 milliGRAM(s) Oral daily  folic acid 1 milliGRAM(s) Oral daily  gabapentin 600 milliGRAM(s) Oral daily  loratadine 10 milliGRAM(s) Oral daily  methadone    Tablet 5 milliGRAM(s) Oral two times a day  montelukast 10 milliGRAM(s) Oral daily  multivitamin Oral Tab/Cap - Peds 1 Tablet(s) Oral daily  norepinephrine Infusion 0.05 MICROgram(s)/kG/Min (8.58 mL/Hr) IV Continuous <Continuous>  pantoprazole    Tablet 40 milliGRAM(s) Oral before breakfast  polyethylene glycol 3350 17 Gram(s) Oral daily  senna 2 Tablet(s) Oral at bedtime  vancomycin  IVPB 1250 milliGRAM(s) IV Intermittent every 24 hours    MEDICATIONS  (PRN):  acetaminophen     Tablet .. 650 milliGRAM(s) Oral every 6 hours PRN Temp greater or equal to 38C (100.4F), Moderate Pain (4 - 6)  oxyCODONE    IR 5 milliGRAM(s) Oral every 4 hours PRN Moderate Pain (4 - 6)      New X-rays reviewed:                                                                                  ECHO    CXR interpreted by me:  Bilateral infiltrates R>L

## 2022-04-14 NOTE — CONSULT NOTE ADULT - SUBJECTIVE AND OBJECTIVE BOX
Gastroenterology Consultation:    Patient is a 71y old  Male who presents with a chief complaint of Sepsis (14 Apr 2022 07:21)      Admitted on: 04-11-22  HPI:  71 year old male with a hx of CHF (unspecified), COPD (5L [place] home oxygen), CAD, DM presenting with dyspnea. Beginning almost one month ago he began to feel chills, fatigue, body aches, associated with a nonproductive cough. He went to [facility] 2 weeks ago due to these symptoms was diagnosed with pneumonia. Was discharged on a course of azithromycin. Symptoms persisted. Then starting 2-3 days ago, started to develop dyspnea at rest. Denies any chest pain, wheezing, abdominal pain, or n/v. Did endorse increased swelling in lower extremities Called EMS, was found to be saturating 80s on 5L. Was placed on 8L and brought to the ED.   In the ED VITALS: Temp 100.1F, , BP 97/64, RR 22, SpO2 94% on 8L. Began to desaturate further and was placed on HFNC. WBC 24.69. Troponin 0.06, BNP 51857. CT chest: 1.  No CT evidence of acute pulmonary embolus.2.  Moderate size multiloculated right pleural effusions.3.  Conglomerate nodes versus a central mass surrounding the proximal right middle lobe bronchi.4.  Numerous other pulmonary nodules measuring up to 9 mm as described above. Mediastinal and bilateral hilar lymphadenopathy. Possible neoplastic process suspected.5.  Ill-defined sclerosis in the left posterior sixth rib. Effusion to be drained in ED. Will be admitted to ICU.      (11 Apr 2022 14:21)      Prior EGD:  Prior Colonoscopy:      PAST MEDICAL & SURGICAL HISTORY:  History of COPD    CAD (coronary atherosclerotic disease)    DM (diabetes mellitus)    Heart failure    History of surgical removal of testicle        FAMILY HISTORY:      Social History:  Tobacco:  Alcohol:  Drugs:    Home Medications:  acyclovir 400 mg oral tablet: 1 tab(s) orally 2 times a day (11 Apr 2022 15:03)  Aspir 81 oral delayed release tablet: 1 tab(s) orally once a day (11 Apr 2022 09:44)  calcium (as calcium citrate) 250 mg oral tablet: 1 tab(s) orally 2 times a day (11 Apr 2022 09:44)  cholecalciferol 25 mcg (1000 intl units) oral tablet: 1 tab(s) orally once a day (11 Apr 2022 15:04)  docusate sodium 100 mg oral tablet: 1 tab(s) orally 2 times a day (11 Apr 2022 15:05)  DULoxetine 20 mg oral delayed release capsule: 1 cap(s) orally once a day (11 Apr 2022 09:44)  eplerenone 25 mg oral tablet: 1 tab(s) orally once a day (11 Apr 2022 15:06)  folic acid 1 mg oral tablet: 1 tab(s) orally once a day (11 Apr 2022 09:44)  Horizant 600 mg oral tablet, extended release: 1 tab(s) orally once a day (11 Apr 2022 15:08)  Lasix 40 mg oral tablet: 1 tab(s) orally once a day (11 Apr 2022 15:21)  loratadine 10 mg oral tablet: 1 tab(s) orally once a day (11 Apr 2022 09:44)  loteprednol 0.2% ophthalmic suspension: 1 drop(s) to each affected eye 2 times a day, As Needed (11 Apr 2022 15:09)  metFORMIN 500 mg oral tablet: 1 tab(s) orally 2 times a day (11 Apr 2022 09:44)  methadone 5 mg oral tablet: orally 2 times a day (11 Apr 2022 09:44)  montelukast 10 mg oral tablet: 1 tab(s) orally once a day (11 Apr 2022 09:44)  multivitamin:  (11 Apr 2022 09:44)  oxyCODONE 5 mg oral tablet: 1 tab(s) orally every 4 hours, As Needed (11 Apr 2022 15:02)  Tyvaso 0.6 mg/mL inhalation solution: 9 puff(s) inhaled 4 times a day (11 Apr 2022 09:44)    MEDICATIONS  (STANDING):  aspirin enteric coated 81 milliGRAM(s) Oral daily  cefTRIAXone   IVPB 2000 milliGRAM(s) IV Intermittent every 12 hours  chlorhexidine 4% Liquid 1 Application(s) Topical <User Schedule>  cholecalciferol 1000 Unit(s) Oral daily  DULoxetine 20 milliGRAM(s) Oral daily  folic acid 1 milliGRAM(s) Oral daily  loratadine 10 milliGRAM(s) Oral daily  methadone    Tablet 5 milliGRAM(s) Oral two times a day  montelukast 10 milliGRAM(s) Oral daily  multivitamin Oral Tab/Cap - Peds 1 Tablet(s) Oral daily  norepinephrine Infusion 0.05 MICROgram(s)/kG/Min (8.58 mL/Hr) IV Continuous <Continuous>  pantoprazole    Tablet 40 milliGRAM(s) Oral before breakfast  polyethylene glycol 3350 17 Gram(s) Oral daily  senna 2 Tablet(s) Oral at bedtime  vancomycin  IVPB 1250 milliGRAM(s) IV Intermittent every 24 hours    MEDICATIONS  (PRN):  acetaminophen     Tablet .. 650 milliGRAM(s) Oral every 6 hours PRN Temp greater or equal to 38C (100.4F), Moderate Pain (4 - 6)  oxyCODONE    IR 5 milliGRAM(s) Oral every 4 hours PRN Moderate Pain (4 - 6)      Allergies  contrast media (iodine-based) (Other)  penicillin (Unknown)      Review of Systems:   Constitutional:  No Fever, No Chills  ENT/Mouth:  No Hearing Changes,  No Difficulty Swallowing  Eyes:  No Eye Pain, No Vision Changes  Cardiovascular:  No Chest Pain, No Palpitations  Respiratory:  No Cough, No Dyspnea  Gastrointestinal:  As described in HPI  Musculoskeletal:  No Joint Swelling, No Back Pain  Skin:  No Skin Lesions, No Jaundice  Neuro:  No Syncope, No Dizziness  Heme/Lymph:  No Bruising, No Bleeding.          Physical Examination:  T(C): 37.3 (04-14-22 @ 12:00), Max: 38.1 (04-13-22 @ 20:00)  HR: 86 (04-14-22 @ 12:00) (86 - 100)  BP: 94/76 (04-14-22 @ 12:00) (59/51 - 113/75)  RR: 19 (04-14-22 @ 12:00) (14 - 41)  SpO2: 94% (04-14-22 @ 12:00) (86% - 99%)      04-12-22 @ 07:01  -  04-13-22 @ 07:00  --------------------------------------------------------  IN: 2775 mL / OUT: 1075 mL / NET: 1700 mL    04-13-22 @ 07:01  -  04-14-22 @ 07:00  --------------------------------------------------------  IN: 1870.3 mL / OUT: 900 mL / NET: 970.3 mL    04-14-22 @ 07:01  -  04-14-22 @ 12:12  --------------------------------------------------------  IN: 5.1 mL / OUT: 200 mL / NET: -194.9 mL        Constitutional: No acute distress.  Eyes:. Conjunctivae are clear, Sclera is non-icteric.  Ears Nose and Throat: The external ears are normal appearing,  Oral mucosa is pink and moist.  Respiratory:  No signs of respiratory distress. Lung sounds are clear bilaterally.  Cardiovascular:  S1 S2, Regular rate and rhythm.  GI: Abdomen is soft, symmetric, and non-tender without distention. There are no visible lesions or scars. Bowel sounds are present and normoactive in all four quadrants. No masses, hepatomegaly, or splenomegaly are noted.   Neuro: No Tremor, No involuntary movements  Skin: No rashes, No Jaundice.          Data:                        11.3   18.14 )-----------( 204      ( 14 Apr 2022 05:30 )             33.1     Hgb Trend:  11.3  04-14-22 @ 05:30  11.1  04-13-22 @ 06:00  11.5  04-12-22 @ 05:00        04-14    126<L>  |  91<L>  |  50<H>  ----------------------------<  128<H>  4.6   |  14<L>  |  2.4<H>    Ca    8.2<L>      14 Apr 2022 05:30  Phos  4.0     04-13  Mg     2.4     04-14    TPro  5.0<L>  /  Alb  2.9<L>  /  TBili  0.7  /  DBili  x   /  AST  2479<H>  /  ALT  1450<H>  /  AlkPhos  128<H>  04-14    Liver panel trend:  TBili 0.7   /   AST 2479   /   ALT 1450   /   AlkP 128   /   Tptn 5.0   /   Alb 2.9    /   DBili --      04-14  TBili 0.9   /   AST 16   /   ALT 9   /   AlkP 89   /   Tptn 5.1   /   Alb 3.3    /   DBili --      04-11  TBili 0.9   /   AST 35   /   ALT 12   /   AlkP 99   /   Tptn 6.0   /   Alb 3.7    /   DBili --      04-11          Culture - Blood (collected 12 Apr 2022 05:00)  Source: .Blood Blood  Preliminary Report (13 Apr 2022 13:01):    No growth to date.    Culture - Body Fluid with Gram Stain (collected 11 Apr 2022 16:40)  Source: .Body Fluid Pleural Fluid  Gram Stain (12 Apr 2022 03:20):    No polymorphonuclear leukocytes seen    No organisms seen    by cytocentrifuge  Preliminary Report (12 Apr 2022 20:31):    No growth to date.          Radiology:       Gastroenterology Consultation:    Patient is a 71y old  Male who presents with a chief complaint of Sepsis (14 Apr 2022 07:21)      Admitted on: 04-11-22  HPI:  71 year old male with a hx of CHF (unspecified), COPD (5L [place] home oxygen), CAD, DM presenting with dyspnea. Beginning almost one month ago he began to feel chills, fatigue, body aches, associated with a nonproductive cough. He went to [facility] 2 weeks ago due to these symptoms was diagnosed with pneumonia. Was discharged on a course of azithromycin. Symptoms persisted. Then starting 2-3 days ago, started to develop dyspnea at rest. Denies any chest pain, wheezing, abdominal pain, or n/v. Did endorse increased swelling in lower extremities Called EMS, was found to be saturating 80s on 5L. Was placed on 8L and brought to the ED admitted for sepsis sec to PNA. GI was consulted for elevated liver enzymes.      PAST MEDICAL & SURGICAL HISTORY:  History of COPD    CAD (coronary atherosclerotic disease)    DM (diabetes mellitus)    Heart failure    History of surgical removal of testicle        FAMILY HISTORY:  no family h/o GI cancers    Social History:  Tobacco: N  Alcohol: N  Drugs: N    Home Medications:  acyclovir 400 mg oral tablet: 1 tab(s) orally 2 times a day (11 Apr 2022 15:03)  Aspir 81 oral delayed release tablet: 1 tab(s) orally once a day (11 Apr 2022 09:44)  calcium (as calcium citrate) 250 mg oral tablet: 1 tab(s) orally 2 times a day (11 Apr 2022 09:44)  cholecalciferol 25 mcg (1000 intl units) oral tablet: 1 tab(s) orally once a day (11 Apr 2022 15:04)  docusate sodium 100 mg oral tablet: 1 tab(s) orally 2 times a day (11 Apr 2022 15:05)  DULoxetine 20 mg oral delayed release capsule: 1 cap(s) orally once a day (11 Apr 2022 09:44)  eplerenone 25 mg oral tablet: 1 tab(s) orally once a day (11 Apr 2022 15:06)  folic acid 1 mg oral tablet: 1 tab(s) orally once a day (11 Apr 2022 09:44)  Horizant 600 mg oral tablet, extended release: 1 tab(s) orally once a day (11 Apr 2022 15:08)  Lasix 40 mg oral tablet: 1 tab(s) orally once a day (11 Apr 2022 15:21)  loratadine 10 mg oral tablet: 1 tab(s) orally once a day (11 Apr 2022 09:44)  loteprednol 0.2% ophthalmic suspension: 1 drop(s) to each affected eye 2 times a day, As Needed (11 Apr 2022 15:09)  metFORMIN 500 mg oral tablet: 1 tab(s) orally 2 times a day (11 Apr 2022 09:44)  methadone 5 mg oral tablet: orally 2 times a day (11 Apr 2022 09:44)  montelukast 10 mg oral tablet: 1 tab(s) orally once a day (11 Apr 2022 09:44)  multivitamin:  (11 Apr 2022 09:44)  oxyCODONE 5 mg oral tablet: 1 tab(s) orally every 4 hours, As Needed (11 Apr 2022 15:02)  Tyvaso 0.6 mg/mL inhalation solution: 9 puff(s) inhaled 4 times a day (11 Apr 2022 09:44)    MEDICATIONS  (STANDING):  aspirin enteric coated 81 milliGRAM(s) Oral daily  cefTRIAXone   IVPB 2000 milliGRAM(s) IV Intermittent every 12 hours  chlorhexidine 4% Liquid 1 Application(s) Topical <User Schedule>  cholecalciferol 1000 Unit(s) Oral daily  DULoxetine 20 milliGRAM(s) Oral daily  folic acid 1 milliGRAM(s) Oral daily  loratadine 10 milliGRAM(s) Oral daily  methadone    Tablet 5 milliGRAM(s) Oral two times a day  montelukast 10 milliGRAM(s) Oral daily  multivitamin Oral Tab/Cap - Peds 1 Tablet(s) Oral daily  norepinephrine Infusion 0.05 MICROgram(s)/kG/Min (8.58 mL/Hr) IV Continuous <Continuous>  pantoprazole    Tablet 40 milliGRAM(s) Oral before breakfast  polyethylene glycol 3350 17 Gram(s) Oral daily  senna 2 Tablet(s) Oral at bedtime  vancomycin  IVPB 1250 milliGRAM(s) IV Intermittent every 24 hours    MEDICATIONS  (PRN):  acetaminophen     Tablet .. 650 milliGRAM(s) Oral every 6 hours PRN Temp greater or equal to 38C (100.4F), Moderate Pain (4 - 6)  oxyCODONE    IR 5 milliGRAM(s) Oral every 4 hours PRN Moderate Pain (4 - 6)      Allergies  contrast media (iodine-based) (Other)  penicillin (Unknown)      Review of Systems:   Constitutional:  No Fever, No Chills  ENT/Mouth:  No Hearing Changes,  No Difficulty Swallowing  Eyes:  No Eye Pain, No Vision Changes  Cardiovascular:  No Chest Pain, No Palpitations  Respiratory:  No Cough, No Dyspnea  Gastrointestinal:  As described in HPI  Musculoskeletal:  No Joint Swelling, No Back Pain  Skin:  No Skin Lesions, No Jaundice  Neuro:  No Syncope, No Dizziness  Heme/Lymph:  No Bruising, No Bleeding.          Physical Examination:  T(C): 37.3 (04-14-22 @ 12:00), Max: 38.1 (04-13-22 @ 20:00)  HR: 86 (04-14-22 @ 12:00) (86 - 100)  BP: 94/76 (04-14-22 @ 12:00) (59/51 - 113/75)  RR: 19 (04-14-22 @ 12:00) (14 - 41)  SpO2: 94% (04-14-22 @ 12:00) (86% - 99%)      04-12-22 @ 07:01  -  04-13-22 @ 07:00  --------------------------------------------------------  IN: 2775 mL / OUT: 1075 mL / NET: 1700 mL    04-13-22 @ 07:01  -  04-14-22 @ 07:00  --------------------------------------------------------  IN: 1870.3 mL / OUT: 900 mL / NET: 970.3 mL    04-14-22 @ 07:01  -  04-14-22 @ 12:12  --------------------------------------------------------  IN: 5.1 mL / OUT: 200 mL / NET: -194.9 mL        Constitutional: No acute distress.  Eyes:. Conjunctivae are clear, Sclera is non-icteric.  Ears Nose and Throat: The external ears are normal appearing,  Oral mucosa is pink and moist.  Respiratory:  No signs of respiratory distress. Lung sounds are clear bilaterally.  Cardiovascular:  S1 S2, Regular rate and rhythm.  GI: Abdomen is soft, symmetric, and non-tender without distention.    Neuro: No Tremor, No involuntary movements  Skin: No rashes, No Jaundice.          Data:                        11.3   18.14 )-----------( 204      ( 14 Apr 2022 05:30 )             33.1     Hgb Trend:  11.3  04-14-22 @ 05:30  11.1  04-13-22 @ 06:00  11.5  04-12-22 @ 05:00        04-14    126<L>  |  91<L>  |  50<H>  ----------------------------<  128<H>  4.6   |  14<L>  |  2.4<H>    Ca    8.2<L>      14 Apr 2022 05:30  Phos  4.0     04-13  Mg     2.4     04-14    TPro  5.0<L>  /  Alb  2.9<L>  /  TBili  0.7  /  DBili  x   /  AST  2479<H>  /  ALT  1450<H>  /  AlkPhos  128<H>  04-14    Liver panel trend:  TBili 0.7   /   AST 2479   /   ALT 1450   /   AlkP 128   /   Tptn 5.0   /   Alb 2.9    /   DBili --      04-14  TBili 0.9   /   AST 16   /   ALT 9   /   AlkP 89   /   Tptn 5.1   /   Alb 3.3    /   DBili --      04-11  TBili 0.9   /   AST 35   /   ALT 12   /   AlkP 99   /   Tptn 6.0   /   Alb 3.7    /   DBili --      04-11          Culture - Blood (collected 12 Apr 2022 05:00)  Source: .Blood Blood  Preliminary Report (13 Apr 2022 13:01):    No growth to date.    Culture - Body Fluid with Gram Stain (collected 11 Apr 2022 16:40)  Source: .Body Fluid Pleural Fluid  Gram Stain (12 Apr 2022 03:20):    No polymorphonuclear leukocytes seen    No organisms seen    by cytocentrifuge  Preliminary Report (12 Apr 2022 20:31):    No growth to date.          Radiology:

## 2022-04-14 NOTE — PROGRESS NOTE ADULT - ASSESSMENT
ASSESSMENT & PLAN  71 year old male with a hx of CHF (unspecified), COPD (5L [place] home oxygen), CAD, DM presenting with dyspnea. Pt reports a 1-month history of chills, fatigue, body aches, associated with a nonproductive cough and was diagnosed with pneumonia 2 weeks ago. Was discharged from [facility] on a course of azithromycin. but symptoms persisted. Then starting 2-3 days ago, started to develop dyspnea at rest.    #Sepsis POA  #G+ bacteremia  - SP azithromycin therapy for pneumonia  - Lactate 2.8 ->2.4 and Procal 8.22  - Leukocytosis of 24.96 on admission -> 19.06   - s. pneumoniae, sensitivities pending.  - d/c'd levaquin and cefepime per ID  - c/w ceftriaxone (started 4/12)  - vancomycin (started 4/14)    #Right pleural effusion SP thoracentesis   -Glu 102, Protein 3.6, , gram stain -ve  -Total nucleated cell count 3690, total RBC count 3000     #Acute hypoxemic respiratory failure  -HO COPD on 5L O2 at home   -currently on HFNCO2, wean as tolerated  -nebs PRN  -continue home inhalers    -ECHO: EF 50-55%, severely enlarged RV, RV volume and pressure overload, severe pulmonary hypertension  -D-dimer 1374, duplex negative  -encourage incentive spirometry   -d/c IVF    #ADITI  -Cr on admission 2.2 (unknown baseline) -> 1.9, stable     #DVT prophylaxis - None    #GI prophylaxis - Protonix    #Diet - DASH/TLC    #Code status - FULL    ASSESSMENT & PLAN  71 year old male with a hx of CHF (unspecified), COPD (5L [place] home oxygen), CAD, DM presenting with dyspnea. Pt reports a 1-month history of chills, fatigue, body aches, associated with a nonproductive cough and was diagnosed with pneumonia 2 weeks ago. Was discharged from [facility] on a course of azithromycin. but symptoms persisted. Then starting 2-3 days ago, started to develop dyspnea at rest.    #Sepsis POA  #G+ bacteremia  - SP azithromycin therapy for pneumonia  - Lactate 2.8 ->2.4 and Procal 8.22  - Leukocytosis of 24.96 on admission -> 19.06   - s. pneumoniae, sensitivities pending.  - d/c'd levaquin and cefepime per ID  - c/w ceftriaxone (started 4/12)  - vancomycin (started 4/14)    #Right pleural effusion SP thoracentesis   -Glu 102, Protein 3.6, , gram stain -ve  -Total nucleated cell count 3690, total RBC count 3000     #Acute hypoxemic respiratory failure  -HO COPD on 5L O2 at home   -currently on HFNCO2, wean as tolerated  -nebs PRN  -continue home inhalers    -ECHO: EF 50-55%, severely enlarged RV, RV volume and pressure overload, severe pulmonary hypertension  -D-dimer 1374, duplex negative  -encourage incentive spirometry   -d/c IVF    #Elevated LFTs  - RUQ US ordered  - Acute hep panel     #ADITI  -Cr on admission 2.2 (unknown baseline) -> 1.9, stable     #DVT prophylaxis - None    #GI prophylaxis - Protonix    #Diet - DASH/TLC    #Code status - FULL    ASSESSMENT & PLAN  71 year old male with a hx of CHF (unspecified), COPD (5L [place] home oxygen), CAD, DM presenting with dyspnea. Pt reports a 1-month history of chills, fatigue, body aches, associated with a nonproductive cough and was diagnosed with pneumonia 2 weeks ago. Was discharged from [facility] on a course of azithromycin. but symptoms persisted. Then starting 2-3 days ago, started to develop dyspnea at rest.    #Sepsis POA  #G+ bacteremia  - SP azithromycin therapy for pneumonia  - Lactate 2.8 ->2.4 and Procal 8.22  - Leukocytosis of 24.96 on admission -> 19.06   - s. pneumoniae, sensitivities pending.  - d/c'd levaquin and cefepime per ID  - c/w ceftriaxone (started 4/12)  - vancomycin (started 4/14)    #Right pleural effusion SP thoracentesis   -Glu 102, Protein 3.6, , gram stain -ve  -Total nucleated cell count 3690, total RBC count 3000     #Acute hypoxemic respiratory failure  -HO COPD on 5L O2 at home   -currently on HFNCO2, wean as tolerated  -nebs PRN  -continue home inhalers    -ECHO: EF 50-55%, severely enlarged RV, RV volume and pressure overload, severe pulmonary hypertension  -D-dimer 1374, duplex negative  -encourage incentive spirometry   -d/c IVF    #Elevated LFTs  - normal on admission  - RUQ US ordered  - Acute hep panel   - Hepatology consulted  - Acyclovir and Gabapentin stopped    #ADITI  -Cr on admission 2.2 (unknown baseline) -> 1.9, stable     #DVT prophylaxis - None    #GI prophylaxis - Protonix    #Diet - DASH/TLC    #Code status - FULL

## 2022-04-14 NOTE — PROGRESS NOTE ADULT - SUBJECTIVE AND OBJECTIVE BOX
CLAUDIA PINTO 71y Male  MRN#: [id number]   CODE STATUS: FULL    Hospital Day: 2d    Pt is currently admitted with the primary diagnosis of sepsis    SUBJECTIVE  Hospital Course:  71 year old male with a hx of CHF (unspecified), COPD (5L [place] home oxygen), CAD, DM presenting with dyspnea. Beginning almost one month ago he began to feel chills, fatigue, body aches, associated with a nonproductive cough. He went to [facility] 2 weeks ago due to these symptoms was diagnosed with pneumonia. Was discharged on a course of azithromycin. Symptoms persisted. Then starting 2-3 days ago, started to develop dyspnea at rest. Denies any chest pain, wheezing, abdominal pain, or n/v. Did endorse increased swelling in lower extremities Called EMS, was found to be saturating 80s on 5L. Was placed on 8L and brought to the ED.   In the ED VITALS: Temp 100.1F, , BP 97/64, RR 22, SpO2 94% on 8L. Began to desaturate further and was placed on HFNC. WBC 24.69. Troponin 0.06, BNP 53485.   CT chest: 1.  No CT evidence of acute pulmonary embolus.2.  Moderate size multiloculated right pleural effusions.3.  Conglomerate nodes versus a central mass surrounding the proximal right middle lobe bronchi.4.  Numerous other pulmonary nodules measuring up to 9 mm as described above. Mediastinal and bilateral hilar lymphadenopathy. Possible neoplastic process suspected.5.  Ill-defined sclerosis in the left posterior sixth rib. Thoracentesis performed in the ED. Admitted to MICU.    Overnight events:   : Patient was hypotensive overnight, did not respond to fluid bolus, and spiked fevers, so he was started on vancomycin and levophed.     : Remains on HFNCO2.      Subjective complaints:   Pt denies chest pain, dizziness, N/V.                                              ----------------------------------------------------------  OBJECTIVE  PAST MEDICAL & SURGICAL HISTORY  History of COPD    CAD (coronary atherosclerotic disease)    DM (diabetes mellitus)    Heart failure    History of surgical removal of testicle                                              -----------------------------------------------------------  ALLERGIES:  contrast media (iodine-based) (Other)  penicillin (Unknown)                                            ------------------------------------------------------------    HOME MEDICATIONS  Home Medications:  acyclovir 400 mg oral tablet: 1 tab(s) orally 2 times a day (2022 15:03)  Aspir 81 oral delayed release tablet: 1 tab(s) orally once a day (:44)  calcium (as calcium citrate) 250 mg oral tablet: 1 tab(s) orally 2 times a day (:44)  cholecalciferol 25 mcg (1000 intl units) oral tablet: 1 tab(s) orally once a day (2022 15:04)  docusate sodium 100 mg oral tablet: 1 tab(s) orally 2 times a day (2022 15:05)  DULoxetine 20 mg oral delayed release capsule: 1 cap(s) orally once a day (:44)  eplerenone 25 mg oral tablet: 1 tab(s) orally once a day (2022 15:06)  folic acid 1 mg oral tablet: 1 tab(s) orally once a day (:44)  Horizant 600 mg oral tablet, extended release: 1 tab(s) orally once a day (2022 15:08)  Lasix 40 mg oral tablet: 1 tab(s) orally once a day (2022 15:21)  loratadine 10 mg oral tablet: 1 tab(s) orally once a day (:44)  loteprednol 0.2% ophthalmic suspension: 1 drop(s) to each affected eye 2 times a day, As Needed (2022 15:09)  metFORMIN 500 mg oral tablet: 1 tab(s) orally 2 times a day (44)  methadone 5 mg oral tablet: orally 2 times a day (2022 09:44)  montelukast 10 mg oral tablet: 1 tab(s) orally once a day (2022 09:44)  multivitamin:  (2022 09:44)  oxyCODONE 5 mg oral tablet: 1 tab(s) orally every 4 hours, As Needed (2022 15:02)  Tyvaso 0.6 mg/mL inhalation solution: 9 puff(s) inhaled 4 times a day (2022 09:44)                           MEDICATIONS:  STANDING MEDICATIONS  acyclovir   Oral Tab/Cap 400 milliGRAM(s) Oral two times a day  aspirin enteric coated 81 milliGRAM(s) Oral daily  cefTRIAXone   IVPB 2000 milliGRAM(s) IV Intermittent every 12 hours  chlorhexidine 4% Liquid 1 Application(s) Topical <User Schedule>  cholecalciferol 1000 Unit(s) Oral daily  DULoxetine 20 milliGRAM(s) Oral daily  folic acid 1 milliGRAM(s) Oral daily  gabapentin 600 milliGRAM(s) Oral daily  loratadine 10 milliGRAM(s) Oral daily  methadone    Tablet 5 milliGRAM(s) Oral two times a day  montelukast 10 milliGRAM(s) Oral daily  multivitamin Oral Tab/Cap - Peds 1 Tablet(s) Oral daily  pantoprazole    Tablet 40 milliGRAM(s) Oral before breakfast  polyethylene glycol 3350 17 Gram(s) Oral daily  senna 2 Tablet(s) Oral at bedtime    PRN MEDICATIONS  acetaminophen     Tablet .. 650 milliGRAM(s) Oral every 6 hours PRN  oxyCODONE    IR 5 milliGRAM(s) Oral every 4 hours PRN                                            ------------------------------------------------------------  VITAL SIGNS: Last 24 Hours  T(C): 36.4 (2022 08:00), Max: 36.9 (2022 04:00)  T(F): 97.6 (2022 08:00), Max: 98.4 (2022 04:00)  HR: 98 (2022 10:00) (92 - 106)  BP: 115/68 (2022 10:00) (91/68 - 115/68)  BP(mean): 85 (2022 10:00) (72 - 92)  RR: 22 (2022 10:00) (19 - 36)  SpO2: 94% (2022 10:00) (87% - 96%)      22 @ 07:01  -  22 @ 07:00  --------------------------------------------------------  IN: 2775 mL / OUT: 1075 mL / NET: 1700 mL    22 @ 07:01  -  22 @ 10:34  --------------------------------------------------------  IN: 75 mL / OUT: 250 mL / NET: -175 mL                                             --------------------------------------------------------------  LABS:                        11.1   17.44 )-----------( 209      ( 2022 06:00 )             32.9     04-13    131<L>  |  94<L>  |  38<H>  ----------------------------<  122<H>  4.2   |  21  |  2.1<H>    Ca    8.5      2022 06:00  Phos  4.0     04-13  Mg     1.9     04-13    TPro  5.1<L>  /  Alb  3.3<L>  /  TBili  0.9  /  DBili  x   /  AST  16  /  ALT  9   /  AlkPhos  89  04-11      Urinalysis Basic - ( 2022 08:10 )    Color: Yellow / Appearance: Clear / S.030 / pH: x  Gluc: x / Ketone: Negative  / Bili: Negative / Urobili: <2 mg/dL   Blood: x / Protein: Negative / Nitrite: Negative   Leuk Esterase: Negative / RBC: 3 /HPF / WBC 1 /HPF   Sq Epi: x / Non Sq Epi: 3 /HPF / Bacteria: Few      Culture - Body Fluid with Gram Stain (collected 2022 16:40)  Source: .Body Fluid Pleural Fluid  Gram Stain (2022 03:20):    No polymorphonuclear leukocytes seen    No organisms seen    by cytocentrifuge  Preliminary Report (2022 20:31):    No growth to date.    Culture - Urine (collected 2022 09:57)  Source: Clean Catch Clean Catch (Midstream)  Preliminary Report (2022 17:00):    10,000 - 49,000 CFU/mL Enterococcus species    <10,000 CFU/ml Normal Urogenital garrett present    Culture - Blood (collected 2022 09:09)  Source: .Blood Blood-Peripheral  Gram Stain (2022 04:43):    Growth in aerobic bottle: Gram Positive Cocci in Pairs and Chains  Preliminary Report (2022 04:43):    Growth in aerobic bottle: Gram Positive Cocci in Pairs and Chains    ***Blood Panel PCR results on this specimen are available    approximately 3 hours after the Gram stain result.***    Gram stain, PCR, and/or culture results may not always    correspond due to difference in methodologies.    ************************************************************    This PCR assay was performed by multiplex PCR. This    Assay tests for 66 bacterial and resistance gene targets.    Please refer to the [facility] test directory    at [email or web address] for details.  Organism: Blood Culture PCR (2022 06:34)  Organism: Blood Culture PCR (2022 06:34)    Culture - Blood (collected 2022 09:09)  Source: .Blood Blood-Peripheral  Preliminary Report (2022 20:01):    No growth to date.        CARDIAC MARKERS ( 2022 05:00 )  x     / 0.03 ng/mL / x     / x     / x      CARDIAC MARKERS ( 2022 21:43 )  x     / 0.04 ng/mL / x     / x     / x                                                    --------------------------------------------------------------    PHYSICAL EXAM:  General: NAD, on HFNCO2, speaking in full sentences   HEENT: NCAT  LUNGS: CTAB, no use of accessory muscles  HEART: RRR, +S1,S2, no murmurs, rubs, or gallops   ABDOMEN: Soft, non-distended, non-tender to palpation  EXT: warm, well perfused  NEURO: AAOx3, No FND's noted  SKIN: No new breakdown or rashes noted                                             --------------------------------------------------------------                                                                                                              ----------------------------------------------------  # DVT prophylaxis     # GI prophylaxis     # Diet     # Activity Score (AM-PAC)    # Code status     # Disposition                                                                              --------------------------------------------------------    # Handoff      CLAUDIA PINTO 71y Male  MRN#: [id number]   CODE STATUS: FULL    Hospital Day: 2d    Pt is currently admitted with the primary diagnosis of sepsis    SUBJECTIVE  Hospital Course:  71 year old male with a hx of CHF (unspecified), COPD (5L [place] home oxygen), CAD, DM presenting with dyspnea. Beginning almost one month ago he began to feel chills, fatigue, body aches, associated with a nonproductive cough. He went to [facility] 2 weeks ago due to these symptoms was diagnosed with pneumonia. Was discharged on a course of azithromycin. Symptoms persisted. Then starting 2-3 days ago, started to develop dyspnea at rest. Denies any chest pain, wheezing, abdominal pain, or n/v. Did endorse increased swelling in lower extremities Called EMS, was found to be saturating 80s on 5L. Was placed on 8L and brought to the ED.   In the ED VITALS: Temp 100.1F, , BP 97/64, RR 22, SpO2 94% on 8L. Began to desaturate further and was placed on HFNC. WBC 24.69. Troponin 0.06, BNP 58283.   CT chest: 1.  No CT evidence of acute pulmonary embolus.2.  Moderate size multiloculated right pleural effusions.3.  Conglomerate nodes versus a central mass surrounding the proximal right middle lobe bronchi.4.  Numerous other pulmonary nodules measuring up to 9 mm as described above. Mediastinal and bilateral hilar lymphadenopathy. Possible neoplastic process suspected.5.  Ill-defined sclerosis in the left posterior sixth rib. Thoracentesis performed in the ED. Admitted to MICU.    Overnight events:   : Patient was hypotensive overnight, did not respond to fluid bolus, and spiked fevers, so he was started on vancomycin and levophed. LFTs were elevated, hepatology was consulted, acyclovir and gabapentin was stopped. RUQ US and Hep panel was ordered.    : Remains on HFNCO2.      Subjective complaints:   Pt denies chest pain, dizziness, N/V.                                              ----------------------------------------------------------  OBJECTIVE  PAST MEDICAL & SURGICAL HISTORY  History of COPD    CAD (coronary atherosclerotic disease)    DM (diabetes mellitus)    Heart failure    History of surgical removal of testicle                                              -----------------------------------------------------------  ALLERGIES:  contrast media (iodine-based) (Other)  penicillin (Unknown)                                            ------------------------------------------------------------    HOME MEDICATIONS  Home Medications:  acyclovir 400 mg oral tablet: 1 tab(s) orally 2 times a day (2022 15:03)  Aspir 81 oral delayed release tablet: 1 tab(s) orally once a day (:44)  calcium (as calcium citrate) 250 mg oral tablet: 1 tab(s) orally 2 times a day (:44)  cholecalciferol 25 mcg (1000 intl units) oral tablet: 1 tab(s) orally once a day (2022 15:04)  docusate sodium 100 mg oral tablet: 1 tab(s) orally 2 times a day (2022 15:05)  DULoxetine 20 mg oral delayed release capsule: 1 cap(s) orally once a day (:44)  eplerenone 25 mg oral tablet: 1 tab(s) orally once a day (2022 15:06)  folic acid 1 mg oral tablet: 1 tab(s) orally once a day (:44)  Horizant 600 mg oral tablet, extended release: 1 tab(s) orally once a day (2022 15:08)  Lasix 40 mg oral tablet: 1 tab(s) orally once a day (2022 15:21)  loratadine 10 mg oral tablet: 1 tab(s) orally once a day (:44)  loteprednol 0.2% ophthalmic suspension: 1 drop(s) to each affected eye 2 times a day, As Needed (2022 15:09)  metFORMIN 500 mg oral tablet: 1 tab(s) orally 2 times a day (2022 09:44)  methadone 5 mg oral tablet: orally 2 times a day (:44)  montelukast 10 mg oral tablet: 1 tab(s) orally once a day (:44)  multivitamin:  (:44)  oxyCODONE 5 mg oral tablet: 1 tab(s) orally every 4 hours, As Needed (2022 15:02)  Tyvaso 0.6 mg/mL inhalation solution: 9 puff(s) inhaled 4 times a day (2022 09:44)                           MEDICATIONS:  STANDING MEDICATIONS  acyclovir   Oral Tab/Cap 400 milliGRAM(s) Oral two times a day  aspirin enteric coated 81 milliGRAM(s) Oral daily  cefTRIAXone   IVPB 2000 milliGRAM(s) IV Intermittent every 12 hours  chlorhexidine 4% Liquid 1 Application(s) Topical <User Schedule>  cholecalciferol 1000 Unit(s) Oral daily  DULoxetine 20 milliGRAM(s) Oral daily  folic acid 1 milliGRAM(s) Oral daily  gabapentin 600 milliGRAM(s) Oral daily  loratadine 10 milliGRAM(s) Oral daily  methadone    Tablet 5 milliGRAM(s) Oral two times a day  montelukast 10 milliGRAM(s) Oral daily  multivitamin Oral Tab/Cap - Peds 1 Tablet(s) Oral daily  pantoprazole    Tablet 40 milliGRAM(s) Oral before breakfast  polyethylene glycol 3350 17 Gram(s) Oral daily  senna 2 Tablet(s) Oral at bedtime    PRN MEDICATIONS  acetaminophen     Tablet .. 650 milliGRAM(s) Oral every 6 hours PRN  oxyCODONE    IR 5 milliGRAM(s) Oral every 4 hours PRN                                            ------------------------------------------------------------  VITAL SIGNS: Last 24 Hours  T(C): 36.4 (2022 08:00), Max: 36.9 (2022 04:00)  T(F): 97.6 (2022 08:00), Max: 98.4 (2022 04:00)  HR: 98 (2022 10:00) (92 - 106)  BP: 115/68 (2022 10:00) (91/68 - 115/68)  BP(mean): 85 (2022 10:00) (72 - 92)  RR: 22 (2022 10:00) (19 - 36)  SpO2: 94% (2022 10:00) (87% - 96%)      22 @ 07:01  -  22 @ 07:00  --------------------------------------------------------  IN: 2775 mL / OUT: 1075 mL / NET: 1700 mL    22 @ 07:01  -  22 @ 10:34  --------------------------------------------------------  IN: 75 mL / OUT: 250 mL / NET: -175 mL                                             --------------------------------------------------------------  LABS:                        11.1   17.44 )-----------( 209      ( 2022 06:00 )             32.9     04-13    131<L>  |  94<L>  |  38<H>  ----------------------------<  122<H>  4.2   |  21  |  2.1<H>    Ca    8.5      2022 06:00  Phos  4.0     04-13  Mg     1.9     13    TPro  5.1<L>  /  Alb  3.3<L>  /  TBili  0.9  /  DBili  x   /  AST  16  /  ALT  9   /  AlkPhos  89        Urinalysis Basic - ( 2022 08:10 )    Color: Yellow / Appearance: Clear / S.030 / pH: x  Gluc: x / Ketone: Negative  / Bili: Negative / Urobili: <2 mg/dL   Blood: x / Protein: Negative / Nitrite: Negative   Leuk Esterase: Negative / RBC: 3 /HPF / WBC 1 /HPF   Sq Epi: x / Non Sq Epi: 3 /HPF / Bacteria: Few      Culture - Body Fluid with Gram Stain (collected 2022 16:40)  Source: .Body Fluid Pleural Fluid  Gram Stain (2022 03:20):    No polymorphonuclear leukocytes seen    No organisms seen    by cytocentrifuge  Preliminary Report (2022 20:31):    No growth to date.    Culture - Urine (collected 2022 09:57)  Source: Clean Catch Clean Catch (Midstream)  Preliminary Report (2022 17:00):    10,000 - 49,000 CFU/mL Enterococcus species    <10,000 CFU/ml Normal Urogenital garrett present    Culture - Blood (collected 2022 09:09)  Source: .Blood Blood-Peripheral  Gram Stain (2022 04:43):    Growth in aerobic bottle: Gram Positive Cocci in Pairs and Chains  Preliminary Report (2022 04:43):    Growth in aerobic bottle: Gram Positive Cocci in Pairs and Chains    ***Blood Panel PCR results on this specimen are available    approximately 3 hours after the Gram stain result.***    Gram stain, PCR, and/or culture results may not always    correspond due to difference in methodologies.    ************************************************************    This PCR assay was performed by multiplex PCR. This    Assay tests for 66 bacterial and resistance gene targets.    Please refer to the [facility] test directory    at [email or web address] for details.  Organism: Blood Culture PCR (2022 06:34)  Organism: Blood Culture PCR (2022 06:34)    Culture - Blood (collected 2022 09:09)  Source: .Blood Blood-Peripheral  Preliminary Report (2022 20:01):    No growth to date.        CARDIAC MARKERS ( 2022 05:00 )  x     / 0.03 ng/mL / x     / x     / x      CARDIAC MARKERS ( 2022 21:43 )  x     / 0.04 ng/mL / x     / x     / x                                                    --------------------------------------------------------------    PHYSICAL EXAM:  General: NAD, on HFNCO2, speaking in full sentences   HEENT: NCAT  LUNGS: CTAB, no use of accessory muscles  HEART: RRR, +S1,S2, no murmurs, rubs, or gallops   ABDOMEN: Soft, non-distended, non-tender to palpation  EXT: warm, well perfused  NEURO: AAOx3, No FND's noted  SKIN: No new breakdown or rashes noted                                             --------------------------------------------------------------                                                                                                              ----------------------------------------------------  # DVT prophylaxis     # GI prophylaxis     # Diet     # Activity Score (AM-PAC)    # Code status     # Disposition                                                                              --------------------------------------------------------    # Handoff

## 2022-04-15 LAB
HAV IGM SER-ACNC: SIGNIFICANT CHANGE UP
HBV CORE IGM SER-ACNC: SIGNIFICANT CHANGE UP
HBV SURFACE AG SER-ACNC: SIGNIFICANT CHANGE UP
HCV AB S/CO SERPL IA: 0.07 S/CO — SIGNIFICANT CHANGE UP (ref 0–0.99)
HCV AB SERPL-IMP: SIGNIFICANT CHANGE UP

## 2022-04-16 LAB
CULTURE RESULTS: SIGNIFICANT CHANGE UP
CULTURE RESULTS: SIGNIFICANT CHANGE UP
SPECIMEN SOURCE: SIGNIFICANT CHANGE UP
SPECIMEN SOURCE: SIGNIFICANT CHANGE UP

## 2022-04-17 LAB
CULTURE RESULTS: SIGNIFICANT CHANGE UP
SPECIMEN SOURCE: SIGNIFICANT CHANGE UP

## 2022-04-21 DIAGNOSIS — E87.1 HYPO-OSMOLALITY AND HYPONATREMIA: ICD-10-CM

## 2022-04-21 DIAGNOSIS — J90 PLEURAL EFFUSION, NOT ELSEWHERE CLASSIFIED: ICD-10-CM

## 2022-04-21 DIAGNOSIS — I11.0 HYPERTENSIVE HEART DISEASE WITH HEART FAILURE: ICD-10-CM

## 2022-04-21 DIAGNOSIS — J18.9 PNEUMONIA, UNSPECIFIED ORGANISM: ICD-10-CM

## 2022-04-21 DIAGNOSIS — E11.9 TYPE 2 DIABETES MELLITUS WITHOUT COMPLICATIONS: ICD-10-CM

## 2022-04-21 DIAGNOSIS — N17.9 ACUTE KIDNEY FAILURE, UNSPECIFIED: ICD-10-CM

## 2022-04-21 DIAGNOSIS — I46.9 CARDIAC ARREST, CAUSE UNSPECIFIED: ICD-10-CM

## 2022-04-21 DIAGNOSIS — I49.3 VENTRICULAR PREMATURE DEPOLARIZATION: ICD-10-CM

## 2022-04-21 DIAGNOSIS — I50.22 CHRONIC SYSTOLIC (CONGESTIVE) HEART FAILURE: ICD-10-CM

## 2022-04-21 DIAGNOSIS — Z99.81 DEPENDENCE ON SUPPLEMENTAL OXYGEN: ICD-10-CM

## 2022-04-21 DIAGNOSIS — I27.22 PULMONARY HYPERTENSION DUE TO LEFT HEART DISEASE: ICD-10-CM

## 2022-04-21 DIAGNOSIS — A40.3 SEPSIS DUE TO STREPTOCOCCUS PNEUMONIAE: ICD-10-CM

## 2022-04-21 DIAGNOSIS — R59.0 LOCALIZED ENLARGED LYMPH NODES: ICD-10-CM

## 2022-04-21 DIAGNOSIS — Z79.84 LONG TERM (CURRENT) USE OF ORAL HYPOGLYCEMIC DRUGS: ICD-10-CM

## 2022-04-21 DIAGNOSIS — Z88.0 ALLERGY STATUS TO PENICILLIN: ICD-10-CM

## 2022-04-21 DIAGNOSIS — R82.71 BACTERIURIA: ICD-10-CM

## 2022-04-21 DIAGNOSIS — R65.20 SEVERE SEPSIS WITHOUT SEPTIC SHOCK: ICD-10-CM

## 2022-04-21 DIAGNOSIS — E87.2 ACIDOSIS: ICD-10-CM

## 2022-04-21 DIAGNOSIS — Z66 DO NOT RESUSCITATE: ICD-10-CM

## 2022-04-21 DIAGNOSIS — J96.21 ACUTE AND CHRONIC RESPIRATORY FAILURE WITH HYPOXIA: ICD-10-CM

## 2022-04-21 DIAGNOSIS — R91.8 OTHER NONSPECIFIC ABNORMAL FINDING OF LUNG FIELD: ICD-10-CM

## 2022-04-21 DIAGNOSIS — Z51.5 ENCOUNTER FOR PALLIATIVE CARE: ICD-10-CM

## 2022-04-21 DIAGNOSIS — Z94.81 BONE MARROW TRANSPLANT STATUS: ICD-10-CM

## 2022-04-21 DIAGNOSIS — K72.00 ACUTE AND SUBACUTE HEPATIC FAILURE WITHOUT COMA: ICD-10-CM

## 2022-04-21 DIAGNOSIS — J44.0 CHRONIC OBSTRUCTIVE PULMONARY DISEASE WITH (ACUTE) LOWER RESPIRATORY INFECTION: ICD-10-CM

## 2022-04-21 DIAGNOSIS — Z87.891 PERSONAL HISTORY OF NICOTINE DEPENDENCE: ICD-10-CM

## 2022-04-21 DIAGNOSIS — I95.9 HYPOTENSION, UNSPECIFIED: ICD-10-CM

## 2022-04-21 DIAGNOSIS — I25.10 ATHEROSCLEROTIC HEART DISEASE OF NATIVE CORONARY ARTERY WITHOUT ANGINA PECTORIS: ICD-10-CM

## 2022-04-21 DIAGNOSIS — Z79.82 LONG TERM (CURRENT) USE OF ASPIRIN: ICD-10-CM

## 2022-04-21 DIAGNOSIS — E66.01 MORBID (SEVERE) OBESITY DUE TO EXCESS CALORIES: ICD-10-CM

## 2022-05-22 NOTE — ED PROCEDURE NOTE - CPROC ED INDICATIONS1
Bed: JNED-01  Expected date: 5/22/22  Expected time:   Means of arrival: Ambulance  Comments:  MWD  Syncope  80 M   Dyspnea

## 2022-08-17 NOTE — ED ADULT NURSE NOTE - NS TRANSFER PATIENT BELONGINGS
Marea Salem  1952  AT RISK FOOT CARE    1  Onychomycosis     2  Peripheral vascular disease, unspecified ([facility] [address] )     3  Callus         Patient presents for at-risk foot care  Patient has no acute concerns today  Patient has significant lower extremity risk due to neuropathy, parasthesia, edema, and trophic skin changes to the lower extremity  On exam patient has thickened, hypertrophic, discolored, brittle toenails with subungual debris and tenderness x10   Callus: 2  Patient has lower extremity edema  PAtients skin is atrophic, thickened nails, and decreased pedal hair  Patient has decreased pinprick and vibratory sensation to his feet and parasthesia    Today's treatment includes:  Debridement of toenails  Using nail nipper, hollie, and curette, nails were sharply debrided, reduced in thickness and length  Devitalized nail tissue and fungal debris excised and removed  Patient tolerated well  Discussed proper shoe gear, daily inspections of feet, and general foot health with patient  Patient has Q9  findings and is recommended for at risk foot care every 9-10 weeks      Patients most recent complete clinical foot exam was on: 
Clothing